# Patient Record
Sex: FEMALE | Race: WHITE | NOT HISPANIC OR LATINO | Employment: OTHER | URBAN - METROPOLITAN AREA
[De-identification: names, ages, dates, MRNs, and addresses within clinical notes are randomized per-mention and may not be internally consistent; named-entity substitution may affect disease eponyms.]

---

## 2018-09-25 ENCOUNTER — OFFICE VISIT CONVERTED (OUTPATIENT)
Dept: SURGERY | Facility: CLINIC | Age: 58
End: 2018-09-25
Attending: SURGERY

## 2018-10-18 ENCOUNTER — CONVERSION ENCOUNTER (OUTPATIENT)
Dept: SURGERY | Facility: CLINIC | Age: 58
End: 2018-10-18

## 2018-10-18 ENCOUNTER — OFFICE VISIT CONVERTED (OUTPATIENT)
Dept: SURGERY | Facility: CLINIC | Age: 58
End: 2018-10-18
Attending: SURGERY

## 2019-08-27 ENCOUNTER — HOSPITAL ENCOUNTER (OUTPATIENT)
Dept: CARDIOLOGY | Facility: HOSPITAL | Age: 59
Discharge: HOME | End: 2019-08-27
Attending: NURSE PRACTITIONER

## 2020-06-22 ENCOUNTER — HOSPITAL ENCOUNTER (OUTPATIENT)
Dept: OTHER | Facility: HOSPITAL | Age: 60
Discharge: HOME | End: 2020-06-22
Attending: NURSE PRACTITIONER

## 2020-06-22 LAB
CREAT BLD-MCNC: 0.7 MG/DL (ref 0.6–1.4)
GFR SERPLBLD BASED ON 1.73 SQ M-ARVRAT: >60 ML/MIN/{1.73_M2}

## 2020-06-26 ENCOUNTER — OFFICE VISIT CONVERTED (OUTPATIENT)
Dept: CARDIOLOGY | Facility: CLINIC | Age: 60
End: 2020-06-26
Attending: INTERNAL MEDICINE

## 2020-06-26 ENCOUNTER — CONVERSION ENCOUNTER (OUTPATIENT)
Dept: CARDIOLOGY | Facility: CLINIC | Age: 60
End: 2020-06-26

## 2020-07-07 ENCOUNTER — CONVERSION ENCOUNTER (OUTPATIENT)
Dept: CARDIOLOGY | Facility: CLINIC | Age: 60
End: 2020-07-07
Attending: INTERNAL MEDICINE

## 2020-07-17 ENCOUNTER — OFFICE VISIT CONVERTED (OUTPATIENT)
Dept: CARDIOLOGY | Facility: CLINIC | Age: 60
End: 2020-07-17
Attending: INTERNAL MEDICINE

## 2020-07-21 ENCOUNTER — HOSPITAL ENCOUNTER (OUTPATIENT)
Dept: CARDIOLOGY | Facility: HOSPITAL | Age: 60
Discharge: HOME | End: 2020-07-21
Attending: INTERNAL MEDICINE

## 2020-07-27 ENCOUNTER — HOSPITAL ENCOUNTER (OUTPATIENT)
Dept: NUCLEAR MEDICINE | Facility: HOSPITAL | Age: 60
Discharge: HOME | End: 2020-07-27
Attending: INTERNAL MEDICINE

## 2020-09-25 ENCOUNTER — OFFICE VISIT CONVERTED (OUTPATIENT)
Dept: ORTHOPEDIC SURGERY | Facility: CLINIC | Age: 60
End: 2020-09-25
Attending: ORTHOPAEDIC SURGERY

## 2020-09-29 ENCOUNTER — OFFICE VISIT CONVERTED (OUTPATIENT)
Dept: CARDIOLOGY | Facility: CLINIC | Age: 60
End: 2020-09-29
Attending: INTERNAL MEDICINE

## 2020-10-30 ENCOUNTER — OFFICE VISIT CONVERTED (OUTPATIENT)
Dept: ORTHOPEDIC SURGERY | Facility: CLINIC | Age: 60
End: 2020-10-30
Attending: ORTHOPAEDIC SURGERY

## 2020-12-15 ENCOUNTER — CONVERSION ENCOUNTER (OUTPATIENT)
Dept: ORTHOPEDIC SURGERY | Facility: CLINIC | Age: 60
End: 2020-12-15

## 2020-12-15 ENCOUNTER — OFFICE VISIT CONVERTED (OUTPATIENT)
Dept: ORTHOPEDIC SURGERY | Facility: CLINIC | Age: 60
End: 2020-12-15
Attending: ORTHOPAEDIC SURGERY

## 2021-01-26 ENCOUNTER — CONVERSION ENCOUNTER (OUTPATIENT)
Dept: ORTHOPEDIC SURGERY | Facility: CLINIC | Age: 61
End: 2021-01-26

## 2021-01-26 ENCOUNTER — OFFICE VISIT CONVERTED (OUTPATIENT)
Dept: ORTHOPEDIC SURGERY | Facility: CLINIC | Age: 61
End: 2021-01-26
Attending: PHYSICIAN ASSISTANT

## 2021-05-10 NOTE — H&P
History and Physical      Patient Name: Tiffani Stacy   Patient ID: [id number]   Sex: Female   YOB: 1960    Primary Care Provider: Ema HASSAN   Referring Provider: Ema HASSAN    Visit Date: June 26, 2020    Provider: Josué Tellez MD   Location: [facility]   Location Address: [address] [place] [address], [facility]   RAGHAVENDRA Mora  [id number]   Location Phone: [phone]          Chief Complaint     Shortness of breath.       History Of Present Illness  Consult requested by: Ema HASSAN   Tiffani Stacy is a 59 year old /White female who presents as a new patient referred by Dr. Jung due to worsening exertional dyspnea and recurrent episodes of substernal chest pain. She has a history of aortic stenosis and has previously followed with Dr. Lora with [facility]. Her last echocardiogram was approximately 1 year ago. I do not have the results of that study at present, but per Dr. Lora's office notes, her previous cardiac testing showed a normal ejection fraction. She is overweight with a BMI of 39.3 and also has a history of hypertension, but denies any history of diabetes mellitus, tobacco abuse, or hyperlipidemia. Her episodes of chest pain typically occur at rest, although she does report a few episodes of exertional chest discomfort. Her episodes of pain consistently resolve spontaneously within 15-20 minutes. She cannot identify any other precipitating factors for her chest pain. She has not taken any nitroglycerin or other medications for the pain. Her pain is nonradiating and is a 5/10 at peak intensity. She reports some associated generalized fatigue and shortness of breath, but no palpitations, nausea, diaphoresis, or other symptoms. Likewise, she has not experienced any orthopnea, PND, syncope, or lightheadedness. She experiences limiting dyspnea after walking approximately 1-2 blocks at this point. She states  this has slightly worsened over the past 6 months.   PAST MEDICAL & SURGICAL HISTORY: Aortic stenosis; Essential hypertension; Gastroesophageal reflux disease; Glaucoma; Obesity; Cholecystectomy; Hysterectomy; Bilateral cataract excision.   PSYCHOSOCIAL HISTORY: She denies any history of tobacco abuse. However, she does report significant second-hand smoke exposure for many years from multiple family members. She drinks 2-3 alcoholic beverages per week, but has no history of alcohol abuse or illicit drug use. Daily caffeine intake. She is single.   FAMILY HISTORY: Positive for diabetes mellitus, hypertension, and heart disease.   CURRENT MEDICATIONS: Alphagan eye drops b.i.d.; bupropion 300 mg daily; cetirizine 10 mg daily; citalopram 10 mg daily; esomeprazole 20 mg daily; Flonase 50 mcg p.r.n.; hydrochlorothiazide 12.5 mg daily; losartan 50 mg daily; multivitamin daily; Mobic 7.5 mg daily; vitamin E 1,000 mg weekly. Dosage and frequency of the medication(s) reviewed with the patient.   ALLERGIES: Naproxen; Penicillin.       Review of Systems  · Constitutional  o Admits  o : fatigue, good general health lately, recent weight changes   · Eyes  o Denies  o : double vision  · HENT  o Denies  o : hearing loss or ringing, chronic sinus problem, swollen glands in neck  · Cardiovascular  o Admits  o : chest pain, palpitations (fast, fluttering, or skipping beats), swelling (feet, ankles, hands), shortness of breath while walking or lying flat  · Respiratory  o Admits  o : chronic or frequent cough  o Denies  o : asthma or wheezing, COPD  · Gastrointestinal  o Admits  o : ulcers, nausea or vomiting  · Neurologic  o Admits  o : lightheaded or dizzy, headaches  o Denies  o : stroke  · Musculoskeletal  o Admits  o : joint pain, back pain  · Endocrine  o Denies  o : thyroid disease, diabetes, heat or cold intolerance, excessive thirst or urination  · Heme-Lymph  o Admits  o : bleeding or bruising tendency  o Denies  o :  "anemia      Vitals  Date Time BP Position Site L\R Cuff Size HR RR TEMP (F) WT  HT  BMI kg/m2 BSA m2 O2 Sat HC       06/26/2020 03:04 /74 Sitting    86 - R   214lbs 16oz 5'  2\" 39.32 2.07     06/26/2020 03:04 /76 Sitting                     Physical Examination  · Constitutional  o Appearance  o : Awake, alert, in no acute distress.  · Head and Face  o HEENT  o : No pallor, anicteric. Eyes normal. Moist mucous membranes.  · Neck  o Inspection/Palpation  o : Supple. No hepatosplenomegaly.  o Jugular Veins  o : No JVD. No carotid bruits.  · Respiratory  o Auscultation of Lungs  o : Clear to auscultation bilaterally. No crackles or wheezing.  · Cardiovascular  o Heart  o : Regular rate and rhythm. Normal S1 and S2. 2/6 systolic ejection murmur at the right upper sternal border with radiation to the carotids. No friction rub or gallop. PMI is nondisplaced.   · Gastrointestinal  o Abdominal Examination  o : Obese.   · Musculoskeletal  o General  o : Normal muscle tone and strength.  · Skin and Subcutaneous Tissue  o General Inspection  o : No skin rashes.  · Extremities  o Extremities  o : Trace bilateral lower extremity edema to mid-calf.   · Labs  o Labs  o : Labs from 06/09/2020 reviewed. Chemistry: Sodium 140, potassium 4.4, chloride 103, bicarb 23, BUN 15, creatinine 0.71, glucose 109. Liver function tests were within normal limits. BNP was normal at 86.8.  · Echocardiogram  o Echocardiogram  o : Previous echocardiogram from [facility] has been requested but not yet received.           Assessment     1.  Aortic stenosis.  2.  Essential hypertension, suboptimally controlled.  3.  Obesity with a BMI of 39.3 today.  4.  Recurrent chest pain, primarily atypical in nature.    5.  Chronic exertional dyspnea, mildly progressive over the last 6 months.       Plan     1.  We will obtain an echocardiogram within the next week to reassess her aortic stenosis.  I have requested        her old echocardiogram " from [facility], but we have not yet received it.  We will review it when        we receive those records.  She wishes to defer changes to her antihypertensive medication regimen for        now.    2.  She was previously scheduled to have a PET stress test with Dr. Lora, but states today that she        wishes to switch her cardiac care to our office.   3.   I will see her back in the office in a few weeks to review her echocardiogram results.  We will likely order         a cardiac ischemic evaluation at that time, particularly if the echocardiogram is unrevealing.      Josué Tellez MD  BP:vm             Electronically Signed by: Soumya Perrin-, Other -Author on June 29, 2020 04:11:35 PM  Electronically Co-signed by: Josué Tellez MD -Reviewer on July 6, 2020 08:13:22 PM

## 2021-05-10 NOTE — PROCEDURES
"   Procedure Note      Patient Name: Tiffani Stacy   Patient ID: [id number]   Sex: Female   YOB: 1960    Primary Care Provider: Ema HASSAN   Referring Provider: Ema HASSAN    Visit Date: July 7, 2020    Provider: Josué Tellez MD   Location: [facility]   Location Address: [address] [place] [address], [address]   RAGHAVENDRA Mora  [id number]   Location Phone: [phone]          FINAL REPORT   TRANSTHORACIC ECHOCARDIOGRAM REPORT    Diagnosis: Aortic stenosis , exertional dyspnea.   Height: 5'2\" Weight: 215 B/P: 150/74 BSA: 2.0   Tech: JTW   MEASUREMENTS:  RVID (Diastole) : RVID. (NORMAL: 0.7 to 2.4 cm max)   LVID (Systole): 2.7 cm (Diastole): 4.3 cm . (NORMAL: 3.7 - 5.4 cm)   Posterior Wall Thickness (Diastole): 1.0 cm. (NORMAL: 0.8 - 1.1 cm)   Septal Thickness (Diastole): 1.0 cm. (NORMAL: 0.7 - 1.2 cm)   LAID (Systole): 3.8 cm. (NORMAL: 1.9 - 3.8 cm)   Aortic Root Diameter (Diastole): 3.1 cm. (NORMAL: 2.0 - 3.7 cm)   COMMENTS:  The patient underwent 2-D, M-Mode, and Doppler examination, including pulse-wave, continuous-wave, and color-flow Doppler analysis; the study is technically adequate. The following findings were noted:   FINDINGS:  MITRAL VALVE: The mitral valve leaflets are mildly thickened, but open well. Mild mitral annular calcification. No mitral valve prolapse or mitral stenosis. Mild mitral regurgitation.   AORTIC VALVE: Trileaflet aortic valve with mildly fibrocalcific leaflets. Moderate aortic regurgitation. Mild aortic stenosis. As noted, there is an increased gradient across the left ventricular outflow tract consistent with mild to moderate subvalvular stenosis.   TRICUSPID VALVE: Normal valve morphology and leaflet excursion. Mild tricuspid regurgitation. Estimated right ventricular systolic pressure was 35 mmHg.   PULMONIC VALVE: Grossly normal. No pulmonic stenosis. Trivial pulmonic regurgitation.   AORTIC ROOT: Normal in size with adequate motion. "   LEFT ATRIUM: Moderate-severely dilated left atrium. LA volume index is 45 mL/m2. Color Doppler imaging across the interatrial septum did not demonstrate any evidence of PFO/shunting.   LEFT VENTRICLE: Normal left ventricular size. Borderline concentric left ventricular hypertrophy. Moderate basal septal hypertrophy with a sigmoid septum. An increased gradient was noted in the left ventricular outflow tract with a peak gradient of 25 mmHg and a mean gradient of 13 mmHg. Normal left ventricular systolic function with estimated ejection fraction of 60 to 65% and normal left ventricular wall motion throughout. Impaired relaxation filling pattern (grade 1 diastolic dysfunction). Indeterminate left ventricular filling pressure. No ventricular thrombus or mass visualized.   RIGHT VENTRICLE: Normal right ventricular size and systolic function.   RIGHT ATRIUM: Normal right atrial size.   PERICARDIUM: No pericardial effusion.   INFERIOR VENA CAVA: Normal IVC size and respirophasic changes.   DOPPLER: E/A ratio is 1.1. DT= 194 msec. IVRT is 113 msec. E/E' is 10.   Faxed: 07/10/2020      CONCLUSIONS:  1.  Normal left ventricular systolic function with an estimated ejection fraction of 60 to 65% and normal left        ventricular wall motion throughout.   2.  Moderate basal septal hypertrophy with a sigmoid septum.  An increased gradient was observed across the        left ventricular outflow tract consistent with mild-moderate subvalvular stenosis.   3.  Moderate to severely dilated left atrium.   4.  Mildly thickened mitral valve leaflets with mild mitral regurgitation.   5.  Moderate aortic regurgitation.       MD STACIA Ochoa/pap      This note was transcribed by Marilu Lin.  vinod/stacia  The above service was transcribed by Marilu Lin, and I attest to the accuracy of the note.  STACIA                 Electronically Signed by: Leatha Lin-, Other -Author on July 10, 2020 03:50:51  PM  Electronically Co-signed by: Josué Tellez MD -Reviewer on July 14, 2020 12:20:13 AM

## 2021-05-10 NOTE — H&P
History and Physical      Patient Name: Tiffani Stacy   Patient ID: [id number]   Sex: Female   YOB: 1960    Primary Care Provider: Ema HASSAN   Referring Provider: Ema HASSAN    Visit Date: September 25, 2020    Provider: Natalie Marques MD   Location: [facility]   Location Address: [address] [place] [address]  [id number]   Location Phone: [phone]          Chief Complaint  · right knee pain      History Of Present Illness  Tiffani Stacy is a 60 year old /White female who presents today to [facility].      Patient presents today with a chief complaint of right knee pain. Patient states that 6 months ago a nurse did a X-ray and was told her cartilage was almost gone. Patient states this recent Saturday she had to leave work because her knee was swollen and she couldn't bend it. Patient denies any injury or trauma to her knee. Patient states 6 months ago her knee has been swelling and started causing her pain. Patient states her PCP doesn't want her getting the cortisone injections done because of her glaucoma. Patient wears a brace as her treatment method. Patient is taking Mobic for her OA. Patient can't take Naproxen or Penicillin.                 Past Medical History  Aortic valve stenosis; Arthritis; Chest pain; Disc degeneration, lumbar; Heart Disease; HTN (hypertension); Hypertension; Limb Swelling; Reflux; Seasonal allergies; Shortness of Breath; Ulcer         Past Surgical History  Cholecystectomy; Eye Implant; Gallbladder; Hysterectomy; Neck         Medication List  Alive Multi Vit; bupropion HCl 300 mg oral tablet extended release 24 hr; Celexa 20 mg oral tablet; cetirizine 10 mg oral tablet; diclofenac sodium 75 mg oral tablet,delayed release (DR/EC); Miriam-C 500 mg oral; Flonase Allergy Relief 50 mcg/actuation nasal spray,suspension; losartan-hydrochlorothiazide 100-25 mg oral tablet; Lumigan 0.01 % ophthalmic (eye) drops; Nexium  "20 mg oral capsule,delayed release(DR/EC); vitamin E 400 unit oral capsule; Zaditor 0.025 % (0.035 %) ophthalmic (eye) drops         Allergy List  Molds; PENICILLINS         Family Medical History  Stroke; Heart Disease; Congestive Heart Failure; Cancer, Unspecified; Diabetes, unspecified type; Skin Carcinoma; Family history of COPD (chronic obstructive pulmonary disease); Family history of Arthritis         Social History  Alcohol Use (Never); Claustophobic (Unknown); lives with other; Recreational Drug Use (Never); Single.; Tobacco (Never); Tobacco use; Working         Review of Systems  · Constitutional  o Denies  o : fever, chills, weight loss  · Cardiovascular  o Denies  o : chest pain, shortness of breath  · Gastrointestinal  o Denies  o : liver disease, heartburn, nausea, blood in stools  · Genitourinary  o Denies  o : painful urination, blood in urine  · Integument  o Denies  o : rash, itching  · Neurologic  o Denies  o : headache, weakness, loss of consciousness  · Musculoskeletal  o Denies  o : painful, swollen joints  · Psychiatric  o Denies  o : drug/alcohol addiction, anxiety, depression      Vitals  Date Time BP Position Site L\R Cuff Size HR RR TEMP (F) WT  HT  BMI kg/m2 BSA m2 O2 Sat FR L/min FiO2 HC       09/25/2020 10:11 AM      69 - R   220lbs 2oz 5'  2\" 40.26 2.09 97 %            Physical Examination  · Constitutional  o Appearance  o : well developed, well-nourished, no obvious deformities present  · Head and Face  o Head  o :   § Inspection  § : normocephalic  o Face  o :   § Inspection  § : no facial lesions  · Eyes  o Conjunctivae  o : conjunctivae normal  o Sclerae  o : sclerae white  · Ears, Nose, Mouth and Throat  o Ears  o :   § External Ears  § : appearance within normal limits  § Hearing  § : intact  o Nose  o :   § External Nose  § : appearance normal  · Neck  o Inspection/Palpation  o : normal appearance  o Range of Motion  o : full range of motion  · Respiratory  o Respiratory " Effort  o : breathing unlabored  o Inspection of Chest  o : normal appearance  o Auscultation of Lungs  o : no audible wheezing or rales  · Cardiovascular  o Heart  o : regular rate  · Gastrointestinal  o Abdominal Examination  o : soft and non-tender  · Skin and Subcutaneous Tissue  o General Inspection  o : intact, no rashes  · Psychiatric  o General  o : Alert and oriented x3  o Judgement and Insight  o : judgment and insight intact  o Mood and Affect  o : mood normal, affect appropriate  · Right Knee  o Inspection  o : Sensation grossly intact. Neurovascular intact. Pulses normal. Flexion and extension 10 to 90 degrees. Mild swelling of the right knee. Mild tenderness on lateral and medial joint line. Non-tender patella. Stable to valgus/varus stress. Good strength in quadriceps, hamstrings, dorsiflexors, and plantar flexors.  · In Office Procedures  o View  o : LAT/SUNRISE/STANDING   o Site  o : right, knee  o Indication  o : Right knee pain   o Study  o : X-rays ordered, taken in the office, and reviewed today.  o Xray  o : Negative for fracture or dislocation, moderate osteoarthritis .   o Comparative Data  o : No comparative data found          Assessment  · Right knee pain, unspecified chronicity     719.46/M25.561      Plan  · Orders  o Knee (Right) [facility] Preferred View ([address]) - 719.46/M25.561 - 09/25/2020  · Medications  o Medications have been Reconciled  o Transition of Care or Provider Policy  · Instructions  o Dr. Marques saw and examined the patient and agrees with plan.   o X-rays reviewed by Dr. Marques.  o Reviewed the patient's Past Medical, Social, and Family history as well as the ROS at today's visit, no changes.  o Call or return if worsening symptoms.  o Follow Up PRN.  o This note was transcribed by Deann Heath.   o Discussed diagnosis and treatment options with the patient. Discussed change in medication, Synvisc injection, PT and at home exercises. Patient opted for getting the Synvisc  injection approved. Patient will opt for PT and medication change. Patient to be off work until injection and patient opted for PT. Patient follow-up PRN.             Electronically Signed by: Melinda Truong, -Author on October 15, 2020 03:17:37 PM  Electronically Co-signed by: Natalie Marques MD -Reviewer on October 16, 2020 08:09:22 AM

## 2021-05-13 NOTE — PROGRESS NOTES
Progress Note      Patient Name: Tiffani Stacy   Patient ID: [id number]   Sex: Female   YOB: 1960    Primary Care Provider: Ema HASSAN   Referring Provider: Ema HASSAN    Visit Date: July 17, 2020    Provider: Josué Tellez MD   Location: [facility]   Location Address: [address] [place] [address], [facility]   [place]  [id number]   Location Phone: [phone]          Chief Complaint  · Shortness of breath       History Of Present Illness  REFERRING CARE PROVIDER: Pratik Jung MD   Tiffani Stacy presents for a routine follow-up and the results of her recent echocardiogram today. It showed normal left ventricular systolic function with an estimated ejection fraction of 60-65% and normal left ventricular wall motion throughout. She was also observed to have moderate basal septal hypertrophy with a sigmoid septum and mild left ventricular outflow tract obstruction with peak and mean gradients of 25 and 13 mmHg respectively. In addition, she had mild valvular aortic stenosis with moderate aortic regurgitation. She continues to have stable exertional dyspnea, not significantly changed since her last visit. No palpitations, PND, orthopnea, syncope or lightheadedness reported. She continues to have intermittent episodes of substernal chest pain. She states the pain is non-radiating, 5 out of 10 at peak intensity and associated with generalized fatigue. It has not increased in frequency, duration or intensity since her last visit in June. Most of her episodes of pain continue to recur at rest, but she has experienced a few episodes of exertional chest discomfort. She also states she had significant right, lower-extremity calf swelling earlier this past week as well as some erythema and mild tenderness in this area. This was largely resolved at this point, although she continues to have some mildly increased, right lower-extremity edema.   PAST MEDICAL HISTORY:  Hypertension; obesity; gastroesophageal reflux disease; mild left ventricular outflow tract obstruction; moderate aortic regurgitation.   PSYCHOSOCIAL HISTORY: No history of tobacco use. No history of alcohol abuse or illicit drug use.   CURRENT MEDICATIONS: include Losartan 50 mg daily; HCTZ 12.5 mg daily; Bupor 300 mg daily; Celexa 10 mg daily; Mobic 7.5 mg daily; Nexium OTC 12.5 mg daily; Zyrtec 10 mg daily; Vitamin E; multivitamin; Vitamin C 1000 mg once a week; Brimonidine . The dosage and frequency of the medications were reviewed with the patient.       Review of Systems  · Cardiovascular  o Admits  o : palpitations (fast, fluttering, or skipping beats), swelling (feet, ankles, hands), shortness of breath while walking or lying flat, chest pain or angina pectoris   · Respiratory  o Admits  o : chronic or frequent cough  o Denies  o : asthma or wheezing      Vitals  Date Time BP Position Site L\R Cuff Size HR RR TEMP (F) WT  HT  BMI kg/m2 BSA m2 O2 Sat        07/17/2020 11:17 /72 Sitting                     Physical Examination  · Respiratory  o Auscultation of Lungs  o : Clear to auscultation bilaterally. No crackles or rhonchi.  · Cardiovascular  o Heart  o : Regular rate and rhythm. S1 and S2 are present. No S3 or S4 gallop. There is a 2/6 systolic murmur at the right upper-sternal border with a 2/6 holodiastolic murmur also noted. No friction rub.  · Gastrointestinal  o Abdominal Examination  o : Obese, soft, nontender, nondistended. No free fluid. Bowel sounds heard in all four quadrants.  · Extremities  o Extremities  o : 1+ bilateral lower-extremity edema, right greater than left. No erythema of the right lower extremity.           Assessment     1.  Chronic exertional dyspnea, Class III, stable.  2.  Recurrent atypical chest pain.  3.  Some valvular aortic stenosis with mild left ventricular outflow tract obstruction secondary to basal septal       hypertrophy.  4.  Mild valvular aortic  stenosis with moderate aortic regurgitation.  5.  Hypertension.  6.  Obesity with a recent BMI of 39.3.  7.  Acute right lower-extremity edema/swelling.  The patient does have a strong family history of DVT.       Plan     I will obtain a venous Doppler ultrasound of her right lower extremity to rule out DVT and further assess her acute right lower-extremity edema/swelling.  Given her recurrent ongoing chest pain and multiple cardiovascular risk factors, will obtain a lexiscan stress test/SPECT study for cardiovascular risk stratification, as Ms. Stacy does not believe she can exercise due to chronic joint pain.  Will start beta blocker therapy with Metoprolol 25 mg b.i.d. due to her mild left ventricular outflow tract obstruction.  She states that she previously did not tolerate a non-selective beta blocker prescribed for glaucoma but is willing to give Metoprolol a trial.  I told her to call the office for further direction if she develops significant side effects on this medication.  May need to consider Disopyramide if she does not tolerate beta blockers well.    Josué Tellez MD  BP/dmd           This note was transcribed by Rosy Hudson.  dmd/BP  The above service was transcribed by Rosy Hudson, and I attest to the accuracy of the note.  BP               Electronically Signed by: Rosy Hudson-, -Author on July 20, 2020 10:33:21 AM  Electronically Co-signed by: Josué Tellez MD -Reviewer on July 20, 2020 04:06:09 PM

## 2021-05-13 NOTE — PROGRESS NOTES
Progress Note      Patient Name: Tiffani Stacy   Patient ID: [id number]   Sex: Female   YOB: 1960    Primary Care Provider: Ema HASSAN   Referring Provider: Ema HASSAN    Visit Date: October 30, 2020    Provider: Natalie Marques MD   Location: [facility]   Location Address: [address] [place] [address]  [id number]   Location Phone: [phone]          Chief Complaint  · Followup Right Knee Osteoarthritis.      History Of Present Illness  Tiffani Stacy is a 60 year old /White female who presents for followup of her right knee. She has been having pain. We tried to get viscosupplementation approved for her, but this has been unsuccessful. She says she cannot go to therapy right now because she cannot afford it because she is off work.       Past Medical History  Aortic valve stenosis; Arthritis; Chest pain; Disc degeneration, lumbar; Heart Disease; HTN (hypertension); Hypertension; Limb Swelling; Reflux; Seasonal allergies; Shortness of Breath; Ulcer         Past Surgical History  Cholecystectomy; Eye Implant; Gallbladder; Hysterectomy; Neck         Medication List  Alive Multi Vit; bupropion HCl 300 mg oral tablet extended release 24 hr; Celexa 20 mg oral tablet; cetirizine 10 mg oral tablet; diclofenac sodium 75 mg oral tablet,delayed release (DR/EC); Miriam-C 500 mg oral; Flonase Allergy Relief 50 mcg/actuation nasal spray,suspension; losartan-hydrochlorothiazide 100-25 mg oral tablet; Lumigan 0.01 % ophthalmic (eye) drops; Nexium 20 mg oral capsule,delayed release(DR/EC); vitamin E 400 unit oral capsule; Zaditor 0.025 % (0.035 %) ophthalmic (eye) drops         Allergy List  Molds; PENICILLINS         Family Medical History  Stroke; Heart Disease; Congestive Heart Failure; Cancer, Unspecified; Diabetes, unspecified type; Skin Carcinoma; Family history of COPD (chronic obstructive pulmonary disease); Family history of Arthritis         Social History  Alcohol  "Use (Never); Claustophobic (Unknown); lives with other; Recreational Drug Use (Never); Single.; Tobacco (Never); Tobacco use; Working         Review of Systems  · Constitutional  o Denies  o : fever, chills, weight loss  · Cardiovascular  o Denies  o : chest pain, shortness of breath  · Gastrointestinal  o Denies  o : liver disease, heartburn, nausea, blood in stools  · Genitourinary  o Denies  o : painful urination, blood in urine  · Integument  o Denies  o : rash, itching  · Neurologic  o Denies  o : headache, weakness, loss of consciousness  · Musculoskeletal  o Admits  o : painful, swollen joints  · Psychiatric  o Denies  o : drug/alcohol addiction, anxiety, depression      Vitals  Date Time BP Position Site L\R Cuff Size HR RR TEMP (F) WT  HT  BMI kg/m2 BSA m2 O2 Sat FR L/min FiO2        10/30/2020 10:12 AM      86 - R   218lbs 0oz 5'  2\" 39.87 2.08 95 %            Physical Examination  · Constitutional  o Appearance  o : well developed, well-nourished, no obvious deformities present  · Head and Face  o Head  o :   § Inspection  § : normocephalic  o Face  o :   § Inspection  § : no facial lesions  · Eyes  o Conjunctivae  o : conjunctivae normal  o Sclerae  o : sclerae white  · Ears, Nose, Mouth and Throat  o Ears  o :   § External Ears  § : appearance within normal limits  § Hearing  § : intact  o Nose  o :   § External Nose  § : appearance normal  · Neck  o Inspection/Palpation  o : normal appearance  o Range of Motion  o : full range of motion  · Respiratory  o Respiratory Effort  o : breathing unlabored  o Inspection of Chest  o : normal appearance  o Auscultation of Lungs  o : no audible wheezing or rales  · Cardiovascular  o Heart  o : regular rate  · Gastrointestinal  o Abdominal Examination  o : soft and non-tender  · Skin and Subcutaneous Tissue  o General Inspection  o : intact, no rashes  · Psychiatric  o General  o : Alert and oriented x3  o Judgement and Insight  o : judgment and insight " intact  o Mood and Affect  o : mood normal, affect appropriate  · Right Knee  o Inspection  o : Joint line tenderness. Tender to palpation. Pain with movement. Positive crepitus. Sensation intact. Pulses normal.           Assessment  · Primary osteoarthritis of right knee     715.16/M17.11  · Right knee pain, unspecified chronicity     719.46/M25.561      Plan  · Medications  o Medications have been Reconciled  o Transition of Care or Provider Policy  · Instructions  o Reviewed the patient's Past Medical, Social, and Family history as well as the ROS at today's visit, no changes.  o Call or return if worsening symptoms.  o Note transcribed by Soumya Perrin. mc  o We are going to have her work on exercises at home and take her anti-inflammatories to get her back to work. We will see if we can get this shot approved in the future. Note for work was given. Follow up p.r.n.             Electronically Signed by: Soumya Perrin-, Other -Author on October 31, 2020 12:40:59 PM  Electronically Co-signed by: Natalie Marques MD -Reviewer on November 1, 2020 02:46:46 PM

## 2021-05-13 NOTE — PROGRESS NOTES
Progress Note      Patient Name: Tiffani Stacy   Patient ID: [id number]   Sex: Female   YOB: 1960    Primary Care Provider: Ema HASSAN   Referring Provider: Ema HASSAN    Visit Date: September 29, 2020    Provider: Josué Tellez MD   Location: [facility]   Location Address: [address] [place] [address], [facility]   RAGHAVENDRA Mora  [id number]   Location Phone: [phone]          Chief Complaint  · Mild shortness of breath       History Of Present Illness  REFERRING CARE PROVIDER: Ema HASSAN   Tiffani Stacy presents for a routine follow-up and the results of her recent testing, which showed no evidence of ischemia or infarction with a calculated ejection fraction of 72% and normal left ventricular wall motion and thickening throughout. Her right lower-extremity venous duplex study showed no evidence of DVT. She does not report any further lower-extremity swelling and states she is feeling well. No episodes of chest pain/pressure, palpitations, orthopnea, PND, lightheadedness or syncope reported. She continues to have some mild exertional dyspnea but states this has actually improved since starting Metoprolol at her last visit. She is tolerating Metoprolol and her other medications well and reports good compliance with them. Her blood pressure is well controlled at 120/58 today.   PAST MEDICAL & PAST SURGICAL HISTORY: Aortic stenosis; essential hypertension; gastroesophageal reflux disease; glaucoma; obesity; cholecystectomy; hysterectomy; bilateral cataract excision.   PSYCHOSOCIAL HISTORY: She rarely drinks alcohol.   CURRENT MEDICATIONS: include Losartan 50 mg daily; HCTZ 12.5 mg daily; Bupropion 300 mg daily; Celexa 10 mg daily; Nexium OTC 12.5 mg daily; Zyrtec 10 mg daily; Vitamin E; multivitamin; Vitamin C 1000 mg once a week; Brimonidine; Metoprolol Tartrate 25 mg b.i.d.; Diclofenac 75 mg b.i.d. The dosage and frequency of the medications were reviewed with the  "patient.       Review of Systems  · Cardiovascular  o Admits  o : palpitations (fast, fluttering, or skipping beats), swelling (feet, ankles, hands), shortness of breath while walking or lying flat, chest pain or angina pectoris   · Respiratory  o Denies  o : chronic or frequent cough      Vitals  Date Time BP Position Site L\R Cuff Size HR RR TEMP (F) WT  HT  BMI kg/m2 BSA m2 O2 Sat FR L/min FiO2 HC       09/29/2020 10:38 /58 Sitting    62 - R   218lbs 0oz 5'  2\" 39.87 2.08             Physical Examination  · Respiratory  o Auscultation of Lungs  o : Clear to auscultation bilaterally. No wheezing, rhonchi or rales. Normal effort. No tachypnea.  · Cardiovascular  o Heart  o : Regular rate and rhythm. Normal S1 and S2. No S3 or S4 gallop. There is a 2/6 systolic murmur at the upper sternal border. No friction, rub or heave.  · Gastrointestinal  o Abdominal Examination  o : Soft, obese, nontender, nondistended. Normal bowel sounds in all quadrants. No masses. No hepatosplenomegaly.   · Extremities  o Extremities  o : No cyanosis or clubbing. No significant lower-extremity edema. 2+ radial pulses bilaterally.          Assessment     1.  Mild left ventricular outflow tract obstruction secondary to basal septal hypertrophy noted on recent       echocardiogram.  2.  Mild valvular aortic stenosis with moderate aortic regurgitation.  3.  Hypertension, well controlled on current medical therapy.  4.  Obesity with a BMI of 39.  5.  Chronic exertional dyspnea, Class II, improved since starting medical therapy with Metoprolol.       Plan     Her symptoms have improved significantly since starting low-dose beta blocker therapy with Metoprolol 25 mg twice daily.  Her LVOT obstruction noted on the recent echocardiogram was mild.  I will plan to repeat an echocardiogram in 2 to 3 years or sooner if she develops worsening symptoms.  Her recent SPECT study was unremarkable and reassuring, so I do not believe she needs further " cardiac testing at this time.  Will continue her current medications.  I will just plan to see her back in the office in one year for a follow-up if she continues to feel well.    Josué Tellez MD  BP/dmd           This note was transcribed by Rosy Hudson.  dmd/BP  The above service was transcribed by Rosy Hudson, and I attest to the accuracy of the note.  BP               Electronically Signed by: Rosy Hudson-, -Author on October 5, 2020 05:06:11 AM  Electronically Co-signed by: Josué Tellez MD -Reviewer on October 23, 2020 10:09:24 AM

## 2021-05-14 VITALS — HEART RATE: 86 BPM | HEIGHT: 62 IN | OXYGEN SATURATION: 95 % | WEIGHT: 218 LBS | BODY MASS INDEX: 40.12 KG/M2

## 2021-05-14 VITALS
DIASTOLIC BLOOD PRESSURE: 58 MMHG | HEIGHT: 62 IN | BODY MASS INDEX: 40.12 KG/M2 | SYSTOLIC BLOOD PRESSURE: 120 MMHG | HEART RATE: 62 BPM | WEIGHT: 218 LBS

## 2021-05-14 VITALS — BODY MASS INDEX: 40.5 KG/M2 | OXYGEN SATURATION: 97 % | HEIGHT: 62 IN | HEART RATE: 69 BPM | WEIGHT: 220.12 LBS

## 2021-05-14 VITALS — WEIGHT: 215 LBS | HEIGHT: 62 IN | HEART RATE: 67 BPM | BODY MASS INDEX: 39.56 KG/M2 | OXYGEN SATURATION: 95 %

## 2021-05-14 VITALS — WEIGHT: 218 LBS | HEIGHT: 62 IN | BODY MASS INDEX: 40.12 KG/M2

## 2021-05-14 NOTE — PROGRESS NOTES
Progress Note      Patient Name: Tiffani Stacy   Patient ID: [id number]   Sex: Female   YOB: 1960    Primary Care Provider: Ema HASSAN   Referring Provider: Ema HASSAN    Visit Date: January 26, 2021    Provider: Carmen Gooden PA-C   Location: [facility]   Location Address: [address] [place] [address]  [id number]   Location Phone: [phone]          Chief Complaint  · Follow up right knee pain      History Of Present Illness  Tiffani Stacy is a 60 year old /White female who presents today to [facility].      Patient is following up for right knee pain, right knee osteoarthritis. Patient states Synvisc injection is providing some relief of pain in right knee. Patient states mild pain on the medial and lateral sides of the knee. Patient states difficulty with prolonged standing.       Past Medical History  Aortic valve stenosis; Arthritis; Chest pain; Disc degeneration, lumbar; Heart Disease; HTN (hypertension); Hypertension; Limb Swelling; Reflux; Seasonal allergies; Shortness of Breath; Ulcer         Past Surgical History  Cholecystectomy; Eye Implant; Gallbladder; Hysterectomy; Neck         Medication List  Alive Multi Vit; bupropion HCl 300 mg oral tablet extended release 24 hr; Celexa 20 mg oral tablet; cetirizine 10 mg oral tablet; diclofenac sodium 75 mg oral tablet,delayed release (DR/EC); Miriam-C 500 mg oral; Flonase Allergy Relief 50 mcg/actuation nasal spray,suspension; losartan-hydrochlorothiazide 100-25 mg oral tablet; Lumigan 0.01 % ophthalmic (eye) drops; Nexium 20 mg oral capsule,delayed release(DR/EC); vitamin E 400 unit oral capsule; Zaditor 0.025 % (0.035 %) ophthalmic (eye) drops         Allergy List  Molds; PENICILLINS         Family Medical History  Stroke; Heart Disease; Congestive Heart Failure; Cancer, Unspecified; Diabetes, unspecified type; Skin Carcinoma; Family history of COPD (chronic obstructive pulmonary  "disease); Family history of Arthritis         Social History  Alcohol Use (Never); Claustophobic (Unknown); lives with other; Recreational Drug Use (Never); Single.; Tobacco (Never); Tobacco use; Working         Review of Systems  · Constitutional  o Denies  o : fever, chills, weight loss  · Cardiovascular  o Denies  o : chest pain, shortness of breath  · Gastrointestinal  o Denies  o : liver disease, heartburn, nausea, blood in stools  · Genitourinary  o Denies  o : painful urination, blood in urine  · Integument  o Denies  o : rash, itching  · Neurologic  o Denies  o : headache, weakness, loss of consciousness  · Musculoskeletal  o Denies  o : painful, swollen joints  · Psychiatric  o Denies  o : drug/alcohol addiction, anxiety, depression      Vitals  Date Time BP Position Site L\R Cuff Size HR RR TEMP (F) WT  HT  BMI kg/m2 BSA m2 O2 Sat FR L/min FiO2 HC       01/26/2021 11:06 AM      67 - R   214lbs 16oz 5'  2\" 39.32 2.07 95 %            Physical Examination  · Constitutional  o Appearance  o : well developed, well-nourished, no obvious deformities present  · Head and Face  o Head  o :   § Inspection  § : normocephalic  o Face  o :   § Inspection  § : no facial lesions  · Eyes  o Conjunctivae  o : conjunctivae normal  o Sclerae  o : sclerae white  · Ears, Nose, Mouth and Throat  o Ears  o :   § External Ears  § : appearance within normal limits  § Hearing  § : intact  o Nose  o :   § External Nose  § : appearance normal  · Neck  o Inspection/Palpation  o : normal appearance  o Range of Motion  o : full range of motion  · Respiratory  o Respiratory Effort  o : breathing unlabored  o Inspection of Chest  o : normal appearance  o Auscultation of Lungs  o : no audible wheezing or rales  · Cardiovascular  o Heart  o : regular rate  · Gastrointestinal  o Abdominal Examination  o : soft and non-tender  · Skin and Subcutaneous Tissue  o General Inspection  o : intact, no rashes  · Psychiatric  o General  o : Alert and " oriented x3  o Judgement and Insight  o : judgment and insight intact  o Mood and Affect  o : mood normal, affect appropriate  · Right Knee-Street  o Inspection  o : no limping gait, weight bearing, swelling present, no ecchymosis, no atrophy, neutral alignment  o Palpation  o : no medial joint line tenderness, no lateral joint line tenderness, no patellar tendon tenderness, no pain of MCL, no pain at LCL  o ROM  o : full extension, full flexion  o Strength  o : full extension, full flexion  o Special Tests  o : negative varus stress, negaitve valgus stress  o Neurovascular  o : Full sensation, Dorsal Pedal Pulse 2+, posteriror tibialis pulse 2+          Assessment  · Primary osteoarthritis of right knee     715.16/M17.11  · Pain: Knee     719.46/M25.569      Plan  · Medications  o Medications have been Reconciled  o Transition of Care or Provider Policy  · Instructions  o Reviewed the patient's Past Medical, Social, and Family history as well as the ROS at today's visit, no changes.  o Call or return if worsening symptoms.  o Follow Up PRN.  o Electronically Identified Patient Education Materials Provided Electronically     Prescribed Voltaren 75mg one po BID #[id number]             Electronically Signed by: MARTHA Herrera-PRIETO -Author on January 26, 2021 11:29:30 AM  Electronically Co-signed by: Natalie Marques MD -Reviewer on January 26, 2021 09:49:14 PM

## 2021-05-14 NOTE — PROGRESS NOTES
Progress Note      Patient Name: Tiffani Stacy   Patient ID: [id number]   Sex: Female   YOB: 1960    Primary Care Provider: Ema HASSAN   Referring Provider: Ema HASSAN    Visit Date: December 15, 2020    Provider: Natalie Marques MD   Location: [facility]   Location Address: [address] [place] [address]  [id number]   Location Phone: [phone]          Chief Complaint  · Right Knee Osteoarthritis      History Of Present Illness  Tiffani Stacy is a 60 year old /White female who presents today to [facility].      Patient presents today with a follow-up of right knee pain. Patient has a history of right knee osteoarthritis. Patient is unable to attend physical therapy at this time as she cannot afford it because she has been off work. Patient has been having right knee pain that has been ongoing for some time with no known injury or trauma. Patient presents today to get Synvisc injection in her right knee. She has no new complaints today. 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 Patient has been switching back and forth between Mobic and Diclofenac.       Past Medical History  Aortic valve stenosis; Arthritis; Chest pain; Disc degeneration, lumbar; Heart Disease; HTN (hypertension); Hypertension; Limb Swelling; Reflux; Seasonal allergies; Shortness of Breath; Ulcer         Past Surgical History  Cholecystectomy; Eye Implant; Gallbladder; Hysterectomy; Neck         Medication List  Alive Multi Vit; bupropion HCl 300 mg oral tablet extended release 24 hr; Celexa 20 mg oral tablet; cetirizine 10 mg oral tablet; diclofenac sodium 75 mg oral tablet,delayed release (DR/EC); Miriam-C 500 mg oral; Flonase Allergy Relief 50 mcg/actuation nasal spray,suspension; losartan-hydrochlorothiazide 100-25 mg oral tablet; Lumigan 0.01 % ophthalmic (eye) drops; Nexium 20 mg oral capsule,delayed release(DR/EC); vitamin E 400 unit oral capsule; Zaditor 0.025 %  "(0.035 %) ophthalmic (eye) drops         Allergy List  Molds; PENICILLINS       Allergies Reconciled  Family Medical History  Stroke; Heart Disease; Congestive Heart Failure; Cancer, Unspecified; Diabetes, unspecified type; Skin Carcinoma; Family history of COPD (chronic obstructive pulmonary disease); Family history of Arthritis         Social History  Alcohol Use (Never); Claustophobic (Unknown); lives with other; Recreational Drug Use (Never); Single.; Tobacco (Never); Tobacco use; Working         Review of Systems  · Constitutional  o Denies  o : fever, chills, weight loss  · Cardiovascular  o Denies  o : chest pain, shortness of breath  · Gastrointestinal  o Denies  o : liver disease, heartburn, nausea, blood in stools  · Genitourinary  o Denies  o : painful urination, blood in urine  · Integument  o Denies  o : rash, itching  · Neurologic  o Denies  o : headache, weakness, loss of consciousness  · Musculoskeletal  o Denies  o : painful, swollen joints  · Psychiatric  o Denies  o : drug/alcohol addiction, anxiety, depression      Vitals  Date Time BP Position Site L\R Cuff Size HR RR TEMP (F) WT  HT  BMI kg/m2 BSA m2 O2 Sat FR L/min FiO2        12/15/2020 11:06 AM         218lbs 0oz 5'  2\" 39.87 2.08             Physical Examination  · Constitutional  o Appearance  o : well developed, well-nourished, no obvious deformities present  · Head and Face  o Head  o :   § Inspection  § : normocephalic  o Face  o :   § Inspection  § : no facial lesions  · Eyes  o Conjunctivae  o : conjunctivae normal  o Sclerae  o : sclerae white  · Ears, Nose, Mouth and Throat  o Ears  o :   § External Ears  § : appearance within normal limits  § Hearing  § : intact  o Nose  o :   § External Nose  § : appearance normal  · Neck  o Inspection/Palpation  o : normal appearance  o Range of Motion  o : full range of motion  · Respiratory  o Respiratory Effort  o : breathing unlabored  o Inspection of Chest  o : normal " appearance  o Auscultation of Lungs  o : no audible wheezing or rales  · Cardiovascular  o Heart  o : regular rate  · Gastrointestinal  o Abdominal Examination  o : soft and non-tender  · Skin and Subcutaneous Tissue  o General Inspection  o : intact, no rashes  · Psychiatric  o General  o : Alert and oriented x3  o Judgement and Insight  o : judgment and insight intact  o Mood and Affect  o : mood normal, affect appropriate  · Right Knee  o Inspection  o : Sensation grossly intact. Neurovascular intact. Skin intact. Calf supple, non-tender. Dorsal Pedal Pulse 2+, posterior tibialis pulse 2+. No swelling, skin discoloration or atrophy. Positive crepitus. Tender medial and lateral joint line. Non-tender patella tendon. Pain with ROM. Near full flexion and extension. Stable to valgus/varus stress. Good strength in quadriceps, hamstrings, dorsiflexors, and plantar flexors.  · Injection Note/Aspiration Note  o Site  o : right knee  o Procedure  o : Procedure: After educating the patient, patient gave consent for procedure. After using Chloraprep, the joint space was injected. The patient tolerated the procedure well.   o Medication  o : Synvisc One 48 mg           Assessment  · Primary osteoarthritis of right knee     715.16/M17.11      Plan  · Orders  o Hyaluronan or derivative, Synvisc or Synvisc-One, for intra-melissa () - 715.16/M17.11 - 12/15/2020   Lot [id number] 02 2023 [employer] Administered by ALMA DELIA Marques MD  o Knee Intra-articular Injection without US Guidance [facility] ([id number]) - 715.16/M17.11 - 12/15/2020  · Medications  o Medications have been Reconciled  o Transition of Care or Provider Policy  · Instructions  o Dr. Marques saw and examined the patient and agrees with plan.   o Reviewed the patient's Past Medical, Social, and Family history as well as the ROS at today's visit, no changes.  o Call or return if worsening symptoms.  o Follow up in 6 weeks.  o This note was transcribed by Deann Heath. mc  o Patient  received Synvisc injection in her right knee and tolerated it well. She will follow-up with us if her pain worsens or doesn't improve. Patient given a note to be off work for this week.            Electronically Signed by: Deann Heath-, Other -Author on December 17, 2020 10:17:02 AM  Electronically Co-signed by: Natalie Marques MD -Reviewer on December 18, 2020 03:20:10 PM

## 2021-05-15 VITALS — SYSTOLIC BLOOD PRESSURE: 140 MMHG | DIASTOLIC BLOOD PRESSURE: 72 MMHG

## 2021-05-15 VITALS
WEIGHT: 215 LBS | HEART RATE: 86 BPM | DIASTOLIC BLOOD PRESSURE: 74 MMHG | BODY MASS INDEX: 39.56 KG/M2 | HEIGHT: 62 IN | SYSTOLIC BLOOD PRESSURE: 150 MMHG

## 2021-05-16 VITALS — HEIGHT: 62 IN | WEIGHT: 197 LBS | BODY MASS INDEX: 36.25 KG/M2 | RESPIRATION RATE: 16 BRPM

## 2021-05-16 VITALS — WEIGHT: 197 LBS | BODY MASS INDEX: 36.25 KG/M2 | HEIGHT: 62 IN | RESPIRATION RATE: 16 BRPM

## 2021-05-20 ENCOUNTER — OFFICE VISIT CONVERTED (OUTPATIENT)
Dept: ORTHOPEDIC SURGERY | Facility: CLINIC | Age: 61
End: 2021-05-20
Attending: ORTHOPAEDIC SURGERY

## 2021-06-05 NOTE — PROGRESS NOTES
Progress Note      Patient Name: Tiffani Stacy   Patient ID: [id number]   Sex: Female   YOB: 1960    Primary Care Provider: Ema HASSAN   Referring Provider: Ema HASSAN    Visit Date: May 20, 2021    Provider: Natalie Marques MD   Location: [facility]   Location Address: [address] [place] [address]  [id number]   Location Phone: [phone]          Chief Complaint  · Bilateral Knee Pain      History Of Present Illness  Tiffani Stacy is a 60 year old /White female who presents today to [facility].      Patient presents today for an evaluation of bilateral knees. Patient has a history of right knee osteoarthritis that she has been treating conservatively. Patient states that Synvisc injection from December that has provided her relief in her right knee for several months. She has been wearing a right knee brace and taking Diclofenac that does provide her with some relief but causes her to swell. She is inquiring about any other form of medication she can take.       Past Medical History  Aortic valve stenosis; Arthritis; Chest pain; Disc degeneration, lumbar; Heart Disease; HTN (hypertension); Hypertension; Limb Swelling; Reflux; Seasonal allergies; Shortness of Breath; Ulcer         Past Surgical History  Cholecystectomy; Eye Implant; Gallbladder; Hysterectomy; Neck         Medication List  Alive Multi Vit; bupropion HCl 300 mg oral tablet extended release 24 hr; Celexa 20 mg oral tablet; cetirizine 10 mg oral tablet; Miriam-C 500 mg oral; Flonase Allergy Relief 50 mcg/actuation nasal spray,suspension; losartan-hydrochlorothiazide 100-25 mg oral tablet; Lumigan 0.01 % ophthalmic (eye) drops; metoprolol tartrate 25 mg oral tablet; Naprosyn 500 mg oral tablet; Nexium 20 mg oral capsule,delayed release(DR/EC); vitamin E 400 unit oral capsule; Zaditor 0.025 % (0.035 %) ophthalmic (eye) drops         Allergy List  Molds; naproxen; PENICILLINS;  "Voltaren       Allergies Reconciled  Family Medical History  Stroke; Heart Disease; Congestive Heart Failure; Cancer, Unspecified; Diabetes, unspecified type; Skin Carcinoma; Family history of COPD (chronic obstructive pulmonary disease); Family history of Arthritis         Social History  Alcohol Use (Never); Claustophobic (Unknown); lives with other; Recreational Drug Use (Never); Single.; Tobacco (Never); Tobacco use; Working         Review of Systems  · Constitutional  o Denies  o : fever, chills, weight loss  · Cardiovascular  o Denies  o : chest pain, shortness of breath  · Gastrointestinal  o Denies  o : liver disease, heartburn, nausea, blood in stools  · Genitourinary  o Denies  o : painful urination, blood in urine  · Integument  o Denies  o : rash, itching  · Neurologic  o Denies  o : headache, weakness, loss of consciousness  · Musculoskeletal  o Denies  o : painful, swollen joints  · Psychiatric  o Denies  o : drug/alcohol addiction, anxiety, depression      Vitals  Date Time BP Position Site L\R Cuff Size HR RR TEMP (F) WT  HT  BMI kg/m2 BSA m2 O2 Sat FR L/min FiO2        05/20/2021 09:19 AM         214lbs 16oz 5'  2\" 39.32 2.07             Physical Examination  · Constitutional  o Appearance  o : well developed, well-nourished, no obvious deformities present  · Head and Face  o Head  o :   § Inspection  § : normocephalic  o Face  o :   § Inspection  § : no facial lesions  · Eyes  o Conjunctivae  o : conjunctivae normal  o Sclerae  o : sclerae white  · Ears, Nose, Mouth and Throat  o Ears  o :   § External Ears  § : appearance within normal limits  § Hearing  § : intact  o Nose  o :   § External Nose  § : appearance normal  · Neck  o Inspection/Palpation  o : normal appearance  o Range of Motion  o : full range of motion  · Respiratory  o Respiratory Effort  o : breathing unlabored  o Inspection of Chest  o : normal appearance  o Auscultation of Lungs  o : no audible wheezing or " rales  · Cardiovascular  o Heart  o : regular rate  · Gastrointestinal  o Abdominal Examination  o : soft and non-tender  · Skin and Subcutaneous Tissue  o General Inspection  o : intact, no rashes  · Psychiatric  o General  o : Alert and oriented x3  o Judgement and Insight  o : judgment and insight intact  o Mood and Affect  o : mood normal, affect appropriate  · Right Knee  o Inspection  o : Limping gait. Full weight bearing. Medial joint line tenderness. Active range of motion from 0-100 degrees of flexion. Moderate swelling. Calf supple, non-tender. Neurovascular intact. Sensation grossly intact.   · Left Knee  o Inspection  o : Limping gait. Full weight bearing. Active range of motion from 0-125. Calf supple, non-tender. Neurovascular intact. Sensation grossly intact.   · In Office Procedures  o View  o : LAT/SUNRISE/STANDING   o Site  o : bilateral, knee  o Indication  o : Bilateral Knee Pain  o Study  o : X-rays ordered, taken in the office, and reviewed today.  o Xray  o : Mild degenerative changes of the left knee with no signs of fracture or dislocation. Right knee demonstrates advanced degenerative changes consistent with severe right knee osteoarthritis, worse in the medial compartment.           Assessment  · Primary osteoarthritis of right knee     715.16/M17.11  · Primary osteoarthritis of left knee     715.16/M17.12  · Pain in both knees, unspecified chronicity       Pain in right knee     719.46/M25.561  Pain in left knee     719.46/M25.562      Plan  · Orders  o Knee (Left) [facility] ([id number]) - 719.46/M25.562 - 05/20/2021  o Knee (Right) [facility] ([id number]) - 719.46/M25.561 - 05/20/2021  · Medications  o Medications have been Reconciled  o Transition of Care or Provider Policy  · Instructions  o Reviewed the patient's Past Medical, Social, and Family history as well as the ROS at today's visit, no changes.  o Call or return if worsening symptoms.  o Discussed  surgery.  o Risks/benefits discussed with patient including, but not limited to: infection, bleeding, neurovascular damage, malunion, nonunion, aesthetic deformity, need for further surgery, and death.  o Discussed with patient the implant type being used during surgery and patient understands and desires to proceed.  o X-ray ordered, taken and reviewed at this visit.  o Reviewed Xrays.  o Follow Up PRN.  o This note was transcribed by Deann Heath. ab/mc  o Discussed treatment plans and diagnosis with the patient. Patient was prescribed Naproxen to see if that will avoid her swelling. We will seek approval for viscosupplementation. She will follow-up if the visco is approved. She is unable to take steroids due to glaucoma. Patient is a candidate for a knee replacement, and she will consider this in the near future.             Electronically Signed by: Deann Heath-, Other -Author on May 20, 2021 03:40:57 PM  Electronically Co-signed by: Tonie Cordero PA-C -Reviewer on May 21, 2021 09:41:25 AM  Electronically Co-signed by: Natalie Marques MD -Reviewer on May 26, 2021 08:19:21 PM

## 2021-06-10 ENCOUNTER — TELEPHONE (OUTPATIENT)
Dept: ORTHOPEDIC SURGERY | Facility: CLINIC | Age: 61
End: 2021-06-10

## 2021-06-10 NOTE — TELEPHONE ENCOUNTER
SYNVISC ONE, RIGHT KNEE, APPROVED, AMINA  Received: Yesterday  Chasidy Ludwig, Cherelle, Regina Rep  Synvisc One Approved by Tariq for the right knee. Effective 06/07/21-06/28/21. Patient last Synvisc injection 12/15/20, can have repeat injection after 06/15/21, please push importance of keeping appt with pt due to auth exp 06/28/21.       CALLED THE PATIENT AND HER APPT IS ON 6-22 AT 3:00 PM RT KNEE, SYNVISC ONE, TO SEE GREGG.

## 2021-06-22 ENCOUNTER — OFFICE VISIT (OUTPATIENT)
Dept: ORTHOPEDIC SURGERY | Facility: CLINIC | Age: 61
End: 2021-06-22

## 2021-06-22 VITALS — HEART RATE: 68 BPM | WEIGHT: 227 LBS | BODY MASS INDEX: 41.77 KG/M2 | HEIGHT: 62 IN | OXYGEN SATURATION: 96 %

## 2021-06-22 DIAGNOSIS — M25.561 CHRONIC PAIN OF RIGHT KNEE: Primary | ICD-10-CM

## 2021-06-22 DIAGNOSIS — M17.11 PRIMARY LOCALIZED OSTEOARTHROSIS OF RIGHT LOWER LEG: ICD-10-CM

## 2021-06-22 DIAGNOSIS — G89.29 CHRONIC PAIN OF RIGHT KNEE: Primary | ICD-10-CM

## 2021-06-22 PROCEDURE — 99214 OFFICE O/P EST MOD 30 MIN: CPT | Performed by: PHYSICIAN ASSISTANT

## 2021-06-22 PROCEDURE — 20610 DRAIN/INJ JOINT/BURSA W/O US: CPT | Performed by: PHYSICIAN ASSISTANT

## 2021-06-22 RX ORDER — BUSPIRONE HYDROCHLORIDE 15 MG/1
TABLET ORAL
COMMUNITY
Start: 2021-06-09 | End: 2022-01-12

## 2021-06-22 RX ORDER — ASCORBIC ACID 100 MG
TABLET,CHEWABLE ORAL
COMMUNITY
End: 2022-04-11

## 2021-06-22 RX ORDER — BUPROPION HYDROCHLORIDE 300 MG/1
300 TABLET ORAL EVERY MORNING
COMMUNITY

## 2021-06-22 RX ORDER — KETOTIFEN FUMARATE 0.35 MG/ML
2 SOLUTION/ DROPS OPHTHALMIC 2 TIMES DAILY
COMMUNITY

## 2021-06-22 RX ORDER — FLUTICASONE PROPIONATE 50 MCG
2 SPRAY, SUSPENSION (ML) NASAL DAILY PRN
COMMUNITY

## 2021-06-22 RX ORDER — LOSARTAN POTASSIUM AND HYDROCHLOROTHIAZIDE 12.5; 5 MG/1; MG/1
1 TABLET ORAL DAILY
COMMUNITY
Start: 2021-05-29

## 2021-06-22 RX ORDER — DICLOFENAC SODIUM 75 MG/1
75 TABLET, DELAYED RELEASE ORAL DAILY
COMMUNITY
Start: 2021-04-19 | End: 2022-03-17 | Stop reason: SDUPTHER

## 2021-06-22 RX ORDER — CETIRIZINE HYDROCHLORIDE 10 MG/1
10 TABLET ORAL NIGHTLY
COMMUNITY

## 2021-06-22 NOTE — ASSESSMENT & PLAN NOTE
Patient opted to receive Synvisc injection today, tolerated well, will follow up as needed in the future.

## 2021-06-22 NOTE — PATIENT INSTRUCTIONS
Rest, ice, elevate leg over the next 1 to 2 days.  If you develop any new or worsening symptoms please call our office.  Follow-up as needed in the future.

## 2021-06-22 NOTE — PROGRESS NOTES
"Chief Complaint  Pain of the Right Knee    Subjective          Tiffani Stacy presents to [facility] for right knee pain.  She is a longstanding history of right knee osteoarthritis.  Patient is wearing a brace in the office today.  She states she takes diclofenac for pain.  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  She feels like her knee is unstable when she is walking, has crepitus with ambulation as well.    Objective   Vital Signs:   Pulse 68   Ht 157.5 cm (62\")   Wt 103 kg (227 lb)   SpO2 96%   BMI 41.52 kg/m²       Physical Exam  Constitutional:       Appearance: Normal appearance. He is well-developed and normal weight.   HENT:      Head: Normocephalic.      Right Ear: Hearing and external ear normal.      Left Ear: Hearing and external ear normal.      Nose: Nose normal.   Eyes:      Conjunctiva/sclera: Conjunctivae normal.   Cardiovascular:      Rate and Rhythm: Normal rate.   Pulmonary:      Effort: Pulmonary effort is normal.      Breath sounds: No wheezing or rales.   Abdominal:      Palpations: Abdomen is soft.      Tenderness: There is no abdominal tenderness.   Musculoskeletal:      Cervical back: Normal range of motion.   Skin:     Findings: No rash.   Neurological:      Mental Status: He is alert and oriented to person, place, and time.   Psychiatric:         Mood and Affect: Mood and affect normal.         Judgment: Judgment normal.     Ortho Exam  Right knee: Skin intact, neurovascularly intact, dorsalis pedis pulses 2+ bilaterally.  Patient has full flexion and extension but there is crepitus with range of motion, gait is antalgic, full range of motion right ankle and right digits on right lower extremity.  Result Review :            Imaging Results (Most Recent)     None         Large Joint Arthrocentesis: R knee  Date/Time: 6/22/2021 3:46 PM  Consent given by: patient  Site marked: " site marked  Timeout: Immediately prior to procedure a time out was called to verify the correct patient, procedure, equipment, support staff and site/side marked as required   Supporting Documentation  Indications: pain   Procedure Details  Location: knee - R knee  Needle gauge: 21 G.  Medications administered: 48 mg hylan 48 MG/6ML              Assessment and Plan    Problem List Items Addressed This Visit        Musculoskeletal and Injuries    Primary localized osteoarthrosis of right lower leg    Chronic pain of right knee - Primary    Current Assessment & Plan     Patient opted to receive Synvisc injection today, tolerated well, will follow up as needed in the future.               Follow Up   Return if symptoms worsen or fail to improve.  Patient Instructions   Rest, ice, elevate leg over the next 1 to 2 days.  If you develop any new or worsening symptoms please call our office.  Follow-up as needed in the future.    Patient was given instructions and counseling regarding her condition or for health maintenance advice. Please see specific information pulled into the AVS if appropriate.

## 2021-07-01 ENCOUNTER — OFFICE VISIT (OUTPATIENT)
Dept: ORTHOPEDIC SURGERY | Facility: CLINIC | Age: 61
End: 2021-07-01

## 2021-07-01 VITALS — HEART RATE: 100 BPM | OXYGEN SATURATION: 95 % | HEIGHT: 62 IN | BODY MASS INDEX: 41.77 KG/M2 | WEIGHT: 227 LBS

## 2021-07-01 DIAGNOSIS — M25.561 CHRONIC PAIN OF RIGHT KNEE: Primary | ICD-10-CM

## 2021-07-01 DIAGNOSIS — G89.29 CHRONIC PAIN OF RIGHT KNEE: Primary | ICD-10-CM

## 2021-07-01 PROCEDURE — 99213 OFFICE O/P EST LOW 20 MIN: CPT | Performed by: PHYSICIAN ASSISTANT

## 2021-07-01 NOTE — PROGRESS NOTES
"Chief Complaint  Pain of the Right Knee    Subjective          Tiffani Stacy presents to [facility] for right knee pain.  She received right knee Synvisc injection last week.  She states that it has slightly helped but she feels like the pain is getting worse and worse with time.  She states \"I know I need a knee replacement.\"  She states that when she gets to work for her 12-hour shifts she has been in excruciating pain within 2 to 4 hours.  She has been wearing a knee brace.  She takes diclofenac for pain.  She is in resting icing and elevating the leg.  It just doesn't seem to be getting better.  Her primary doctor prescribed her tramadol that she occasionally has to take if it gets severe.  She states she cannot take steroids due to glaucoma history.    Objective   Vital Signs:   Pulse 100   Ht 157.5 cm (62\")   Wt 103 kg (227 lb)   SpO2 95%   BMI 41.52 kg/m²       Physical Exam  Constitutional:       Appearance: Normal appearance. He is well-developed and normal weight.   HENT:      Head: Normocephalic.      Right Ear: Hearing and external ear normal.      Left Ear: Hearing and external ear normal.      Nose: Nose normal.   Eyes:      Conjunctiva/sclera: Conjunctivae normal.   Cardiovascular:      Rate and Rhythm: Normal rate.   Pulmonary:      Effort: Pulmonary effort is normal.      Breath sounds: No wheezing or rales.   Abdominal:      Palpations: Abdomen is soft.      Tenderness: There is no abdominal tenderness.   Musculoskeletal:      Cervical back: Normal range of motion.   Skin:     Findings: No rash.   Neurological:      Mental Status: He is alert and oriented to person, place, and time.   Psychiatric:         Mood and Affect: Mood and affect normal.         Judgment: Judgment normal.     Ortho Exam  Right knee: Skin intact, moderate swelling, full flexion, full extension, gait is mildly antalgic, dorsalis pedis pulses 2+, sensation to light touch is intact in the " lower extremities, stable to varus and valgus stress.  Result Review :{ Labs  Result Review  Imaging  Med Tab  Media :23}            Imaging Results (Most Recent)     None                Assessment and Plan    Problem List Items Addressed This Visit     None          Follow Up   Return in about 4 weeks (around 7/29/2021) for Recheck.  Patient Instructions   Follow-up in 4 to 6 weeks, rest ice and elevate, continue home medications, may alternate diclofenac and Tylenol.    Patient was given instructions and counseling regarding her condition or for health maintenance advice. Please see specific information pulled into the AVS if appropriate.

## 2021-07-01 NOTE — PATIENT INSTRUCTIONS
Follow-up in 4 to 6 weeks, rest ice and elevate, continue home medications, may alternate diclofenac and Tylenol.

## 2021-07-01 NOTE — ASSESSMENT & PLAN NOTE
Given patient's severe amount of pain, we will have her stay off work to rest her leg for the next few weeks, she will follow-up in a few weeks time so we can reassess, determine if she is ready back for work with or without restrictions.

## 2021-07-15 VITALS — BODY MASS INDEX: 39.56 KG/M2 | HEIGHT: 62 IN | WEIGHT: 215 LBS

## 2021-07-29 ENCOUNTER — OFFICE VISIT (OUTPATIENT)
Dept: ORTHOPEDIC SURGERY | Facility: CLINIC | Age: 61
End: 2021-07-29

## 2021-07-29 VITALS — WEIGHT: 223 LBS | OXYGEN SATURATION: 97 % | BODY MASS INDEX: 41.04 KG/M2 | HEIGHT: 62 IN | HEART RATE: 81 BPM

## 2021-07-29 DIAGNOSIS — M17.11 PRIMARY LOCALIZED OSTEOARTHROSIS OF RIGHT LOWER LEG: Primary | ICD-10-CM

## 2021-07-29 PROCEDURE — 99212 OFFICE O/P EST SF 10 MIN: CPT | Performed by: PHYSICIAN ASSISTANT

## 2021-07-29 NOTE — PATIENT INSTRUCTIONS
Patient given work note to be off of work for the following 3 weeks to allow inflammation to subside in her knee.   Able to return to work thereafter, with use of brace.   Continue HEP.   She is to call with any changes or concerns.   Patient invited to follow up on an as needed basis.

## 2021-07-29 NOTE — PROGRESS NOTES
"Chief Complaint  Pain of the Right Knee    Subjective          Tiffani Stacy presents to [facility] for follow up of right knee pain, right knee osteoarthritis. Patient received Synvisc injection of her knee on 06-22-21, she states it helps with lubrication of her knee, but does not help long periods of standing. She is candidate for right TKA. Patient is unable to take steroid injections, due to history of glaucoma. 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 She states she wears a brace, which seems to help. She has been off of work for the past 4 weeks and states her knee pain has lessened since then.     Objective   Vital Signs:   Pulse 81   Ht 157.5 cm (62\")   Wt 101 kg (223 lb)   SpO2 97%   BMI 40.79 kg/m²       Physical Exam  Constitutional:       Appearance: Normal appearance. He is well-developed and normal weight.   HENT:      Head: Normocephalic.      Right Ear: Hearing and external ear normal.      Left Ear: Hearing and external ear normal.      Nose: Nose normal.   Eyes:      Conjunctiva/sclera: Conjunctivae normal.   Cardiovascular:      Rate and Rhythm: Normal rate.   Pulmonary:      Effort: Pulmonary effort is normal.      Breath sounds: No wheezing or rales.   Abdominal:      Palpations: Abdomen is soft.      Tenderness: There is no abdominal tenderness.   Musculoskeletal:      Cervical back: Normal range of motion.   Skin:     Findings: No rash.   Neurological:      Mental Status: He is alert and oriented to person, place, and time.   Psychiatric:         Mood and Affect: Mood and affect normal.         Judgment: Judgment normal.     Ortho Exam  Right knee: Tenderness the medial joint line.  Tenderness lateral joint line.  Full weightbearing.  Mildly antalgic gait.  Stable to varus valgus stress.  Good muscle tone the quadriceps and hamstrings muscles.  Sensation is intact.  Neurovascular intact.  Posterior tibialis pulse 2+.  Dorsalis pedis pulse " 2+.    Result Review :   The following data was reviewed by: MARTHA Jenkins on 07/29/2021:         Imaging Results (Most Recent)     None                Assessment and Plan    Problem List Items Addressed This Visit        Musculoskeletal and Injuries    Primary localized osteoarthrosis of right lower leg - Primary          Follow Up   Return if symptoms worsen or fail to improve.  Patient Instructions   Patient given work note to be off of work for the following 3 weeks to allow inflammation to subside in her knee.   Able to return to work thereafter, with use of brace.   Continue HEP.   She is to call with any changes or concerns.   Patient invited to follow up on an as needed basis.     Patient was given instructions and counseling regarding her condition or for health maintenance advice. Please see specific information pulled into the AVS if appropriate.

## 2022-01-12 ENCOUNTER — OFFICE VISIT (OUTPATIENT)
Dept: GASTROENTEROLOGY | Facility: CLINIC | Age: 62
End: 2022-01-12

## 2022-01-12 VITALS
HEART RATE: 64 BPM | DIASTOLIC BLOOD PRESSURE: 64 MMHG | HEIGHT: 62 IN | BODY MASS INDEX: 39.96 KG/M2 | WEIGHT: 217.15 LBS | SYSTOLIC BLOOD PRESSURE: 142 MMHG

## 2022-01-12 DIAGNOSIS — R10.13 EPIGASTRIC PAIN: ICD-10-CM

## 2022-01-12 DIAGNOSIS — Z12.11 COLON CANCER SCREENING: ICD-10-CM

## 2022-01-12 DIAGNOSIS — R12 HEARTBURN: Primary | ICD-10-CM

## 2022-01-12 PROCEDURE — 99204 OFFICE O/P NEW MOD 45 MIN: CPT | Performed by: NURSE PRACTITIONER

## 2022-01-12 RX ORDER — ESOMEPRAZOLE MAGNESIUM 40 MG/1
40 CAPSULE, DELAYED RELEASE ORAL DAILY
Qty: 90 CAPSULE | Refills: 1 | Status: SHIPPED | OUTPATIENT
Start: 2022-01-12 | End: 2022-07-14

## 2022-01-12 RX ORDER — POLYETHYLENE GLYCOL 3350, SODIUM CHLORIDE, SODIUM BICARBONATE, POTASSIUM CHLORIDE 420; 11.2; 5.72; 1.48 G/4L; G/4L; G/4L; G/4L
4000 POWDER, FOR SOLUTION ORAL ONCE
Qty: 4000 ML | Refills: 0 | Status: SHIPPED | OUTPATIENT
Start: 2022-01-12 | End: 2022-01-12

## 2022-01-12 RX ORDER — TRAVOPROST OPHTHALMIC SOLUTION 0.04 MG/ML
SOLUTION OPHTHALMIC
COMMUNITY
Start: 2021-08-20 | End: 2022-10-25

## 2022-01-12 NOTE — PROGRESS NOTES
Patient Name: Tiffani Stacy   Visit Date: 01/12/2022   Patient ID: [id number]  Provider: SONYA Ruiz    Sex: female  Location:  Location Address:  Location Phone: [address] [place] [address] [phone]    YOB: 1960      Primary Care Provider Pratik Jung MD      Referring Provider: No ref. provider found        Chief Complaint  Heartburn (Reflux), Abdominal Pain (Mid Abdomen ), and GI Problem (Belching )    History of Present Illness  Tiffani Stacy is a 61 y.o. who presents to [facility] on referral from No ref. provider found for a gastroenterology evaluation of Heartburn (Reflux), Abdominal Pain (Mid Abdomen ), and GI Problem (Belching ).    Ms. Stacy Presents with complaints of acid reflux. Has been taking Nexium 20 mg for several years now and is no longer working. Previously tried Prilosec and Protonix as well with no relief. Occasional nausea without vomiting. Denies dysphagia. Epigastric pain waxes and wanes. Nothing makes the pain better or worse. Appetite has increased, weight stable. Drinking multiple cups of caffeine daily. Avoids NSAIDS and limits spicy/acidic foods.     Having a bowel movement several times a week. Denies any hematochezia, melena, or family hx of colon cancer. No prior colon cancer screening.    Labs Result Review Imaging    Past Medical History:   Diagnosis Date   • Acid reflux    • Aortic valve stenosis    • Arthritis    • Chest pain    • Condition not found     ULCER    • Disc degeneration, lumbar    • Heart disease    • HTN (hypertension)    • Limb swelling    • Seasonal allergies    • Shortness of breath        Past Surgical History:   Procedure Laterality Date   • CHOLECYSTECTOMY     • EYE SURGERY      EYE IMPLANT    • GALLBLADDER SURGERY     • HYSTERECTOMY     • NECK SURGERY      CYST REMOVAL    • UPPER GASTROINTESTINAL ENDOSCOPY      Over ten years ago         Current Outpatient  Medications:   •  Ascorbic Acid (Vitamin C) 100 MG chewable tablet, , Disp: , Rfl:   •  benzonatate (TESSALON) 200 MG capsule, Take 1 capsule by mouth 3 (Three) Times a Day As Needed for Cough., Disp: 30 capsule, Rfl: 0  •  brimonidine (ALPHAGAN P) 0.1 % solution ophthalmic solution, , Disp: , Rfl:   •  buPROPion XL (WELLBUTRIN XL) 300 MG 24 hr tablet, bupropion HCl 300 mg oral tablet extended release 24 hr take 1 tablet (300 mg) by oral route once daily   Active, Disp: , Rfl:   •  cetirizine (zyrTEC) 10 MG tablet, , Disp: , Rfl:   •  citalopram (CeleXA) 40 MG tablet, Take 40 mg by mouth Daily., Disp: , Rfl:   •  diclofenac (VOLTAREN) 75 MG EC tablet, Take 75 mg by mouth 2 (Two) Times a Day., Disp: , Rfl:   •  fluticasone (Flonase) 50 MCG/ACT nasal spray, , Disp: , Rfl:   •  ketotifen (Zaditor) 0.025 % ophthalmic solution, , Disp: , Rfl:   •  losartan-hydrochlorothiazide (HYZAAR) 50-12.5 MG per tablet, Take 1 tablet by mouth Daily., Disp: , Rfl:   •  metoprolol tartrate (LOPRESSOR) 25 MG tablet, metoprolol tartrate 25 mg oral tablet take 1 tablet (25 mg) by oral route 2 times per day 2/17/2021  Active, Disp: , Rfl:   •  Pediatric Multivit-Minerals-C (ALIVE MULTI-VITAMIN CHILDRENS PO), , Disp: , Rfl:   •  travoprost, BAK free, (Travatan Z) 0.004 % solution ophthalmic solution, , Disp: , Rfl:   •  vitamin E 100 UNIT capsule, , Disp: , Rfl:   •  esomeprazole (nexIUM) 40 MG capsule, Take 1 capsule by mouth Daily., Disp: 90 capsule, Rfl: 1  •  polyethylene glycol-electrolytes (Nulytely with Flavor Packs) 420 g solution, Take 4,000 mL by mouth 1 (One) Time for 1 dose., Disp: 4000 mL, Rfl: 0     Allergies   Allergen Reactions   • Molds & Smuts Anaphylaxis   • Naproxen Swelling   • Trichophyton Itching   • Adhesive Tape Rash   • Penicillins Rash       Family History   Problem Relation Age of Onset   • Heart disease Mother    • Cancer Mother    • Arthritis Mother    • Stroke Father    • Heart disease Father    • Diabetes  "Father    • Arthritis Father    • Heart failure Other    • Other Other         SKIN CARCINOMA    • COPD Other         Social History     Social History Narrative    CLAUSTROPHOBIC - YES     EXPOSED TO SECOND HAND SMOKE - 10/3/16          Objective     Review of Systems   Constitutional: Positive for appetite change. Negative for unexpected weight loss.   Gastrointestinal: Positive for abdominal pain, nausea and indigestion.        Vital Signs:   /64 (BP Location: Left arm, Patient Position: Sitting, Cuff Size: Large Adult)   Pulse 64   Ht 157.5 cm (62\")   Wt 98.5 kg (217 lb 2.5 oz)   BMI 39.72 kg/m²       Physical Exam  Constitutional:       General: She is not in acute distress.     Appearance: Normal appearance. She is well-developed and normal weight.   HENT:      Head: Normocephalic and atraumatic.   Eyes:      Conjunctiva/sclera: Conjunctivae normal.      Pupils: Pupils are equal, round, and reactive to light.      Visual Fields: Right eye visual fields normal and left eye visual fields normal.   Cardiovascular:      Rate and Rhythm: Normal rate and regular rhythm.      Heart sounds: Normal heart sounds.   Pulmonary:      Effort: Pulmonary effort is normal. No retractions.      Breath sounds: Normal breath sounds and air entry.   Abdominal:      General: Bowel sounds are normal. There is no distension.      Palpations: Abdomen is soft.      Tenderness: There is no abdominal tenderness.      Comments: No appreciable hepatosplenomegaly or ascites   Musculoskeletal:         General: Normal range of motion.      Cervical back: Normal range of motion and neck supple.   Skin:     General: Skin is warm and dry.   Neurological:      Mental Status: She is alert and oriented to person, place, and time.   Psychiatric:         Mood and Affect: Mood and affect normal.         Behavior: Behavior normal.         Result Review :   The following data was reviewed by: SONYA Ruiz on 01/12/2022:      No " results found for: IRON, TIBC, FERRITIN, LABIRON           Assessment and Plan    Diagnoses and all orders for this visit:    1. Heartburn (Primary)  -     Case Request; Standing  -     Case Request    2. Epigastric pain  -     Case Request; Standing  -     Case Request    3. Colon cancer screening  -     Case Request; Standing  -     Case Request    Other orders  -     Follow Anesthesia Guidelines / Protocol; Future  -     Obtain Informed Consent; Future  -     polyethylene glycol-electrolytes (Nulytely with Flavor Packs) 420 g solution; Take 4,000 mL by mouth 1 (One) Time for 1 dose.  Dispense: 4000 mL; Refill: 0  -     esomeprazole (nexIUM) 40 MG capsule; Take 1 capsule by mouth Daily.  Dispense: 90 capsule; Refill: 1      ESOPHAGOGASTRODUODENOSCOPY (N/A), COLONOSCOPY (N/A)     Instructed patient to decrease caffeine usage.    Follow Up   Return for Follow up after procedure.  Patient was given instructions and counseling regarding her condition or for health maintenance advice. Please see specific information pulled into the AVS if appropriate.

## 2022-02-09 ENCOUNTER — OFFICE VISIT (OUTPATIENT)
Dept: CARDIOLOGY | Facility: CLINIC | Age: 62
End: 2022-02-09

## 2022-02-09 VITALS
WEIGHT: 216 LBS | HEART RATE: 56 BPM | DIASTOLIC BLOOD PRESSURE: 60 MMHG | BODY MASS INDEX: 39.75 KG/M2 | SYSTOLIC BLOOD PRESSURE: 137 MMHG | HEIGHT: 62 IN

## 2022-02-09 DIAGNOSIS — Q24.8 LEFT VENTRICULAR OUTFLOW TRACT OBSTRUCTION: Primary | ICD-10-CM

## 2022-02-09 DIAGNOSIS — I35.1 MODERATE AORTIC VALVE REGURGITATION: ICD-10-CM

## 2022-02-09 DIAGNOSIS — E66.01 SEVERE OBESITY (BMI 35.0-39.9) WITH COMORBIDITY: ICD-10-CM

## 2022-02-09 DIAGNOSIS — I10 ESSENTIAL HYPERTENSION: ICD-10-CM

## 2022-02-09 PROCEDURE — 99214 OFFICE O/P EST MOD 30 MIN: CPT | Performed by: INTERNAL MEDICINE

## 2022-02-09 RX ORDER — MULTIPLE VITAMINS W/ MINERALS TAB 9MG-400MCG
1 TAB ORAL DAILY
COMMUNITY

## 2022-02-09 NOTE — PROGRESS NOTES
Chief Complaint  Aortic Valve Stenosis    Subjective            Tiffani Stacy presents to [facility] CARDIOLOGY  History of Present Illness  Ms. Stacy presents for routine follow-up today and states she is feeling well with no new problems reported.  Her mild chronic exertional dyspnea remains stable and unchanged since her last visit.  She has not experienced any episodes of chest pain/pressure, palpitations, orthopnea, PND, peripheral edema, lightheadedness, or syncope.  Her exercise tolerance and physical activity level remained stable.  Unfortunately, she remains significantly overweight with a stable BMI of 39.5 today.  She has a history of mild LVOT obstruction due to basal septal hypertrophy (peak and mean gradients were 25 and 13 mmHg, respectively) noted on her previous echocardiogram in July 2020.  She was also noted to have moderate aortic regurgitation on that study.  A SPECT study also obtained in 2020 showed no evidence of ischemia or infarction with a calculated ejection fraction of 72% and normal left ventricular wall motion throughout.  Ms. Stacy states she is tolerating all her home medications well and her blood pressure is typically in the 120s-130s/60s-70s when checked at home.  Her BP was 137/60 in the office today.      Past Medical History:   Diagnosis Date   • Acid reflux    • Aortic valve stenosis    • Arthritis    • Chest pain    • Condition not found     ULCER    • Disc degeneration, lumbar    • Heart disease    • HTN (hypertension)    • Limb swelling    • Seasonal allergies    • Shortness of breath        Past Surgical History:   • CHOLECYSTECTOMY   • EYE SURGERY    EYE IMPLANT    • GALLBLADDER SURGERY   • HYSTERECTOMY   • NECK SURGERY    CYST REMOVAL    • UPPER GASTROINTESTINAL ENDOSCOPY    Over ten years ago       Social History     Tobacco Use   • Smoking status: Never Smoker   • Smokeless tobacco: Never Used   Vaping Use   • Vaping Use: Never used    Substance Use Topics   • Alcohol use: Never   • Drug use: Never       Family History   Problem Relation Age of Onset   • Heart disease Mother    • Cancer Mother    • Arthritis Mother    • Stroke Father    • Heart disease Father    • Diabetes Father    • Arthritis Father    • Heart failure Other    • Other Other         SKIN CARCINOMA    • COPD Other         Current Outpatient Medications on File Prior to Visit   Medication Sig   • Ascorbic Acid (Vitamin C) 100 MG chewable tablet Every 7 (Seven) Days.   • brimonidine (ALPHAGAN P) 0.1 % solution ophthalmic solution    • buPROPion XL (WELLBUTRIN XL) 300 MG 24 hr tablet bupropion HCl 300 mg oral tablet extended release 24 hr take 1 tablet (300 mg) by oral route once daily   Active   • cetirizine (zyrTEC) 10 MG tablet Daily.   • citalopram (CeleXA) 40 MG tablet Take 40 mg by mouth Daily.   • diclofenac (VOLTAREN) 75 MG EC tablet Take 75 mg by mouth Daily.   • esomeprazole (nexIUM) 40 MG capsule Take 1 capsule by mouth Daily.   • fluticasone (Flonase) 50 MCG/ACT nasal spray    • ketotifen (Zaditor) 0.025 % ophthalmic solution    • losartan-hydrochlorothiazide (HYZAAR) 50-12.5 MG per tablet Take 1 tablet by mouth Daily.   • metoprolol tartrate (LOPRESSOR) 25 MG tablet metoprolol tartrate 25 mg oral tablet take 1 tablet (25 mg) by oral route 2 times per day 2/17/2021  Active   • multivitamin with minerals (MULTIVITAMIN ADULT PO) Take 1 tablet by mouth Daily.   • travoprost, BAK free, (Travatan Z) 0.004 % solution ophthalmic solution    • vitamin E 100 UNIT capsule Daily.   • [DISCONTINUED] benzonatate (TESSALON) 200 MG capsule Take 1 capsule by mouth 3 (Three) Times a Day As Needed for Cough.   • [DISCONTINUED] Pediatric Multivit-Minerals-C (ALIVE MULTI-VITAMIN CHILDRENS PO)      No current facility-administered medications on file prior to visit.       Allergies   Allergen Reactions   • Molds & Smuts Anaphylaxis   • Naproxen Swelling   • Trichophyton Itching   • Adhesive  "Tape Rash   • Penicillins Rash       Review of Systems   Constitutional: Positive for fatigue. Negative for chills and fever.   HENT: Negative for hearing loss, nosebleeds and sore throat.    Eyes: Negative for pain and visual disturbance.   Respiratory: Positive for shortness of breath. Negative for cough and wheezing.    Cardiovascular: Negative for chest pain, palpitations and leg swelling.   Gastrointestinal: Negative for abdominal pain, blood in stool and vomiting.   Genitourinary: Negative for dysuria and hematuria.   Musculoskeletal: Positive for arthralgias. Negative for joint swelling and myalgias.   Skin: Negative for color change, pallor and rash.   Neurological: Negative for tremors, syncope, light-headedness and headache.        Objective     /60   Pulse 56   Ht 157.5 cm (62\")   Wt 98 kg (216 lb)   BMI 39.51 kg/m²       Physical Exam  Constitutional:       General: She is not in acute distress.     Appearance: Normal appearance.   HENT:      Head: Atraumatic.      Mouth/Throat:      Mouth: Mucous membranes are moist.      Pharynx: Oropharynx is clear. No oropharyngeal exudate.   Eyes:      General: No scleral icterus.     Conjunctiva/sclera: Conjunctivae normal.   Neck:      Vascular: No carotid bruit or JVD.   Cardiovascular:      Rate and Rhythm: Normal rate and regular rhythm.  No extrasystoles are present.     Chest Wall: PMI is not displaced.      Pulses:           Radial pulses are 2+ on the right side and 2+ on the left side.      Heart sounds: S1 normal and S2 normal. Murmur heard.    Systolic murmur is present with a grade of 2/6.  No friction rub. No gallop. No S3 sounds.       Comments: 2/6 systolic murmur at RUSB and LUSB with mild diastolic murmur noted at RUSB  Pulmonary:      Effort: Pulmonary effort is normal. No tachypnea or respiratory distress.      Breath sounds: No wheezing, rhonchi or rales.   Chest:      Chest wall: No tenderness.   Abdominal:      General: Bowel sounds " are normal. There is no distension.      Palpations: Abdomen is soft. There is no mass.      Tenderness: There is no abdominal tenderness.      Comments: Obese.   Musculoskeletal:         General: No swelling, tenderness or deformity.      Cervical back: Neck supple. No tenderness.      Right lower leg: No edema.      Left lower leg: No edema.   Skin:     General: Skin is warm and dry.      Coloration: Skin is not jaundiced.      Findings: No erythema or rash.      Nails: There is no clubbing.   Neurological:      General: No focal deficit present.      Mental Status: She is alert and oriented to person, place, and time.      Motor: No weakness.   Psychiatric:         Mood and Affect: Mood normal.         Behavior: Behavior normal.       Result Review :     The following data was reviewed by: Josué Tellez MD on 02/09/2022:    Echocardiogram 7/7/20:  CONCLUSIONS:  1.  Normal left ventricular systolic function with an estimated ejection fraction of 60 to 65% and normal left        ventricular wall motion throughout.   2.  Moderate basal septal hypertrophy with a sigmoid septum.  An increased gradient was observed across the        left ventricular outflow tract consistent with mild-moderate subvalvular stenosis.   3.  Moderate to severely dilated left atrium.   4.  Mildly thickened mitral valve leaflets with mild mitral regurgitation.   5.  Moderate aortic regurgitation.       Assessment and Plan      Diagnoses and all orders for this visit:    1. Left ventricular outflow tract obstruction (Primary)  -     Adult Transthoracic Echo Complete w/ Color, Spectral and Contrast if necessary per protocol; Future    2. Essential hypertension  -     Adult Transthoracic Echo Complete w/ Color, Spectral and Contrast if necessary per protocol; Future    3. Moderate aortic valve regurgitation  -     Adult Transthoracic Echo Complete w/ Color, Spectral and Contrast if necessary per protocol; Future    4. Severe obesity (BMI  35.0-39.9) with comorbidity (HCC)    -Mild LVOT obstruction due to basal septal hypertrophy:  Her previously reported symptoms have remained significantly improved on medical therapy with metoprolol; continue same.  We will plan to add verapamil to her regimen if she develops worsening symptoms.  We will repeat an echocardiogram a few weeks prior to her next visit in 1 year for reassessment of her LVOT gradient and aortic regurgitation.    -Hypertension:  Her BP remains adequately controlled on current doses of losartan, HCTZ, and metoprolol; continue same.    -Moderate aortic regurgitation: We will recheck an echocardiogram prior to her next visit as noted above.    -Obesity: BMI stable at 39.5 today.  I again recommended a 50-60 lb. weight loss and we reviewed dietary strategies and regimens to assist with this goal.  Increased aerobic exercise was strongly recommended.      Follow Up     Return in about 1 year (around 2/9/2023) for Next scheduled follow up.    Patient was given instructions and counseling regarding her condition or for health maintenance advice. Please see specific information pulled into the AVS if appropriate.     Tiffani Stacy  reports that she has never smoked. She has never used smokeless tobacco.. I have educated her on the risk of diseases from using tobacco products such as cancer, COPD and heart disease.

## 2022-03-11 ENCOUNTER — TELEPHONE (OUTPATIENT)
Dept: ORTHOPEDIC SURGERY | Facility: CLINIC | Age: 62
End: 2022-03-11

## 2022-03-11 NOTE — TELEPHONE ENCOUNTER
Provider: MO  Caller: AMERICA MILLS  Relationship to Patient: PATIENT  Pharmacy: N/A  Phone Number: [phone]  Reason for Call: PATIENT CALLING TO SCHEDULE SYNVISC FOR BILAT KNEE  When was the patient last seen: 7.29.21

## 2022-03-17 ENCOUNTER — OFFICE VISIT (OUTPATIENT)
Dept: ORTHOPEDIC SURGERY | Facility: CLINIC | Age: 62
End: 2022-03-17

## 2022-03-17 VITALS — WEIGHT: 218 LBS | BODY MASS INDEX: 40.12 KG/M2 | HEIGHT: 62 IN

## 2022-03-17 DIAGNOSIS — M17.11 PRIMARY LOCALIZED OSTEOARTHROSIS OF RIGHT LOWER LEG: Primary | ICD-10-CM

## 2022-03-17 PROCEDURE — 20610 DRAIN/INJ JOINT/BURSA W/O US: CPT | Performed by: ORTHOPAEDIC SURGERY

## 2022-03-17 RX ORDER — DICLOFENAC SODIUM 75 MG/1
75 TABLET, DELAYED RELEASE ORAL DAILY
Qty: 60 TABLET | Refills: 1 | Status: SHIPPED | OUTPATIENT
Start: 2022-03-17 | End: 2022-11-06 | Stop reason: HOSPADM

## 2022-03-17 RX ORDER — LATANOPROST 50 UG/ML
1 SOLUTION/ DROPS OPHTHALMIC NIGHTLY
COMMUNITY
Start: 2022-03-11

## 2022-03-17 NOTE — PROGRESS NOTES
"Chief Complaint  Follow-up of the Right Knee     Subjective      Tiffani Stacy presents to [facility] for follow-up of right knee pain, osteoarthritis. Patient reports she is not working at this time and the gel injections have been helping her longer. She reports doing well overall and is here today for her right knee Synvisc injection. She is not ready for replacement at this time. No new complaints.     Allergies   Allergen Reactions   • Molds & Smuts Anaphylaxis   • Naproxen Swelling   • Trichophyton Itching   • Adhesive Tape Rash   • Penicillins Rash        Social History     Socioeconomic History   • Marital status: Single   Tobacco Use   • Smoking status: Never Smoker   • Smokeless tobacco: Never Used   Vaping Use   • Vaping Use: Never used   Substance and Sexual Activity   • Alcohol use: Never   • Drug use: Never   • Sexual activity: Defer        Review of Systems     Objective   Vital Signs:   Ht 157.5 cm (62\")   Wt 98.9 kg (218 lb)   BMI 39.87 kg/m²       Physical Exam  Constitutional:       Appearance: Normal appearance. Patient is well-developed and normal weight.   HENT:      Head: Normocephalic.      Right Ear: Hearing and external ear normal.      Left Ear: Hearing and external ear normal.      Nose: Nose normal.   Eyes:      Conjunctiva/sclera: Conjunctivae normal.   Cardiovascular:      Rate and Rhythm: Normal rate.   Pulmonary:      Effort: Pulmonary effort is normal.      Breath sounds: No wheezing or rales.   Abdominal:      Palpations: Abdomen is soft.      Tenderness: There is no abdominal tenderness.   Musculoskeletal:      Cervical back: Normal range of motion.   Skin:     Findings: No rash.   Neurological:      Mental Status: Patient is alert and oriented to person, place, and time.   Psychiatric:         Mood and Affect: Mood and affect normal.         Judgment: Judgment normal.       Ortho Exam      Full extension, flexion 100 degrees, neuro intact, " sensation intact, pulses present, stable to varus and valgus stress, Stable to anterior and posterior drawer, negative Lachman's and Criss's, ambulates with mild antalgic gait, skin intact, no swelling, no skin discoloration      Large Joint Arthrocentesis: R knee  Date/Time: 3/17/2022 3:16 PM  Consent given by: patient  Site marked: site marked  Timeout: Immediately prior to procedure a time out was called to verify the correct patient, procedure, equipment, support staff and site/side marked as required   Supporting Documentation  Indications: pain   Procedure Details  Location: knee - R knee  Needle gauge: 21G.  Medications administered: 48 mg hylan 48 MG/6ML  Patient tolerance: patient tolerated the procedure well with no immediate complications            Imaging Results (Most Recent)     None           Result Review :             Assessment and Plan     DX: right knee osteoarthritis      Call or return if worsening symptoms.    Follow Up     Patient tolerated the right knee Synvisc injection well. Follow-up as needed.       Patient was given instructions and counseling regarding her condition or for health maintenance advice. Please see specific information pulled into the AVS if appropriate.     Scribed for Natalie Marques MD by Fernanda Vences.  03/17/22   15:07 EDT    I have personally performed the services described in this document as scribed by the above individual and it is both accurate and complete. Natalie Marques MD 03/18/22

## 2022-04-06 ENCOUNTER — TELEPHONE (OUTPATIENT)
Dept: GASTROENTEROLOGY | Facility: CLINIC | Age: 62
End: 2022-04-06

## 2022-04-11 RX ORDER — MULTIVIT WITH MINERALS/LUTEIN
1000 TABLET ORAL
COMMUNITY

## 2022-04-11 RX ORDER — HYDROXYZINE HYDROCHLORIDE 10 MG/1
15 TABLET, FILM COATED ORAL 3 TIMES DAILY PRN
COMMUNITY

## 2022-04-11 NOTE — PRE-PROCEDURE INSTRUCTIONS
Pt. Instructed on laxative and skin prep, pre-op meds, clear liquid diet. Ok to take all meds except Vitamin C, multivitamin, vitamin E, Diclofenac,Losartan, a.m. of procedure.

## 2022-04-12 ENCOUNTER — HOSPITAL ENCOUNTER (OUTPATIENT)
Facility: HOSPITAL | Age: 62
Setting detail: HOSPITAL OUTPATIENT SURGERY
Discharge: HOME | End: 2022-04-12
Attending: INTERNAL MEDICINE | Admitting: INTERNAL MEDICINE

## 2022-04-12 ENCOUNTER — ANESTHESIA EVENT (OUTPATIENT)
Dept: GASTROENTEROLOGY | Facility: HOSPITAL | Age: 62
End: 2022-04-12

## 2022-04-12 ENCOUNTER — ANESTHESIA (OUTPATIENT)
Dept: GASTROENTEROLOGY | Facility: HOSPITAL | Age: 62
End: 2022-04-12

## 2022-04-12 VITALS
WEIGHT: 216.93 LBS | SYSTOLIC BLOOD PRESSURE: 132 MMHG | HEART RATE: 90 BPM | OXYGEN SATURATION: 96 % | DIASTOLIC BLOOD PRESSURE: 81 MMHG | BODY MASS INDEX: 39.68 KG/M2 | RESPIRATION RATE: 17 BRPM | TEMPERATURE: 97.6 F

## 2022-04-12 DIAGNOSIS — R10.13 EPIGASTRIC PAIN: ICD-10-CM

## 2022-04-12 DIAGNOSIS — Z12.11 COLON CANCER SCREENING: ICD-10-CM

## 2022-04-12 DIAGNOSIS — R12 HEARTBURN: ICD-10-CM

## 2022-04-12 PROCEDURE — 88305 TISSUE EXAM BY PATHOLOGIST: CPT | Performed by: INTERNAL MEDICINE

## 2022-04-12 PROCEDURE — 45385 COLONOSCOPY W/LESION REMOVAL: CPT | Performed by: INTERNAL MEDICINE

## 2022-04-12 PROCEDURE — 43239 EGD BIOPSY SINGLE/MULTIPLE: CPT | Performed by: INTERNAL MEDICINE

## 2022-04-12 PROCEDURE — 25010000002 PROPOFOL 10 MG/ML EMULSION: Performed by: NURSE ANESTHETIST, CERTIFIED REGISTERED

## 2022-04-12 RX ORDER — LIDOCAINE HYDROCHLORIDE 20 MG/ML
INJECTION, SOLUTION INFILTRATION; PERINEURAL AS NEEDED
Status: DISCONTINUED | OUTPATIENT
Start: 2022-04-12 | End: 2022-04-12 | Stop reason: SURG

## 2022-04-12 RX ORDER — GLYCOPYRROLATE 0.2 MG/ML
INJECTION INTRAMUSCULAR; INTRAVENOUS AS NEEDED
Status: DISCONTINUED | OUTPATIENT
Start: 2022-04-12 | End: 2022-04-12 | Stop reason: SURG

## 2022-04-12 RX ORDER — PROPOFOL 10 MG/ML
VIAL (ML) INTRAVENOUS AS NEEDED
Status: DISCONTINUED | OUTPATIENT
Start: 2022-04-12 | End: 2022-04-12 | Stop reason: SURG

## 2022-04-12 RX ORDER — SODIUM CHLORIDE, SODIUM LACTATE, POTASSIUM CHLORIDE, CALCIUM CHLORIDE 600; 310; 30; 20 MG/100ML; MG/100ML; MG/100ML; MG/100ML
30 INJECTION, SOLUTION INTRAVENOUS CONTINUOUS
Status: DISCONTINUED | OUTPATIENT
Start: 2022-04-12 | End: 2022-04-12 | Stop reason: HOSPADM

## 2022-04-12 RX ADMIN — PROPOFOL 100 MG: 10 INJECTION, EMULSION INTRAVENOUS at 10:10

## 2022-04-12 RX ADMIN — PROPOFOL 200 MCG/KG/MIN: 10 INJECTION, EMULSION INTRAVENOUS at 10:10

## 2022-04-12 RX ADMIN — SODIUM CHLORIDE, POTASSIUM CHLORIDE, SODIUM LACTATE AND CALCIUM CHLORIDE 30 ML/HR: 600; 310; 30; 20 INJECTION, SOLUTION INTRAVENOUS at 09:35

## 2022-04-12 RX ADMIN — GLYCOPYRROLATE 0.2 MG: 0.2 INJECTION INTRAMUSCULAR; INTRAVENOUS at 10:17

## 2022-04-12 RX ADMIN — LIDOCAINE HYDROCHLORIDE 100 MG: 20 INJECTION, SOLUTION INFILTRATION; PERINEURAL at 10:10

## 2022-04-12 NOTE — ANESTHESIA PREPROCEDURE EVALUATION
Anesthesia Evaluation     Patient summary reviewed and Nursing notes reviewed                Airway   Mallampati: I  TM distance: >3 FB  Neck ROM: full  No difficulty expected  Dental    (+) lower dentures    Pulmonary - normal exam    breath sounds clear to auscultation  (+) shortness of breath,   Cardiovascular     Rhythm: regular  Rate: normal    (+) hypertension, valvular problems/murmurs AS, murmur,       Neuro/Psych  (+) psychiatric history,    GI/Hepatic/Renal/Endo    (+)  GERD,      Musculoskeletal     Abdominal    Substance History - negative use     OB/GYN negative ob/gyn ROS         Other   arthritis,                      Anesthesia Plan    ASA 3     general     intravenous induction     Anesthetic plan, all risks, benefits, and alternatives have been provided, discussed and informed consent has been obtained with: patient.        CODE STATUS:

## 2022-04-12 NOTE — H&P
Pre Procedure History & Physical    Chief Complaint:   Heartburn, epigastric pain, screening colonoscopy    Subjective     HPI:   60 yo F here for eval of heartburn, epigastric pain, screening colonoscopy.    Past Medical History:   Past Medical History:   Diagnosis Date   • Acid reflux    • Anesthesia complication     slept for 3 days with wisdom teeth at young age - no problems since   • Anxiety and depression    • Aortic valve stenosis    • Arthritis    • Chest pain    • Condition not found     ULCER    • Disc degeneration, lumbar    • Glaucoma    • Heart disease    • HTN (hypertension)    • Limb swelling    • Seasonal allergies    • Shortness of breath        Past Surgical History:  Past Surgical History:   Procedure Laterality Date   • CHOLECYSTECTOMY     • CYST REMOVAL      neck   • EYE SURGERY      EYE IMPLANT    • HYSTERECTOMY     • INJECTION OF MEDICATION Right     knee   • UPPER GASTROINTESTINAL ENDOSCOPY      Over ten years ago   • WISDOM TOOTH EXTRACTION         Family History:  Family History   Problem Relation Age of Onset   • Heart disease Mother    • Cancer Mother    • Arthritis Mother    • Stroke Father    • Heart disease Father    • Diabetes Father    • Arthritis Father    • Heart failure Other    • Other Other         SKIN CARCINOMA    • COPD Other    • Malig Hyperthermia Neg Hx        Social History:   reports that she has never smoked. She has never used smokeless tobacco. She reports that she does not drink alcohol and does not use drugs.    Medications:   Medications Prior to Admission   Medication Sig Dispense Refill Last Dose   • ascorbic acid (VITAMIN C) 1000 MG tablet Take 1,000 mg by mouth Every 7 (Seven) Days.      • buPROPion XL (WELLBUTRIN XL) 300 MG 24 hr tablet bupropion HCl 300 mg oral tablet extended release 24 hr take 1 tablet (300 mg) by oral route once daily   Active      • cetirizine (zyrTEC) 10 MG tablet Take 10 mg by mouth Daily.      • citalopram (CeleXA) 40 MG tablet Take  40 mg by mouth Daily.      • esomeprazole (nexIUM) 40 MG capsule Take 1 capsule by mouth Daily. 90 capsule 1    • fluticasone (FLONASE) 50 MCG/ACT nasal spray 2 sprays into the nostril(s) as directed by provider Daily As Needed.      • ketotifen (ZADITOR) 0.025 % ophthalmic solution Administer 2 drops to both eyes 2 (Two) Times a Day.      • latanoprost (XALATAN) 0.005 % ophthalmic solution INSTILL 1 DROP INTO EACH EYE NIGHTLY      • losartan-hydrochlorothiazide (HYZAAR) 50-12.5 MG per tablet Take 1 tablet by mouth Daily.      • metoprolol tartrate (LOPRESSOR) 25 MG tablet Take 1 tablet by mouth twice daily 180 tablet 3    • multivitamin with minerals tablet tablet Take 1 tablet by mouth Daily.      • vitamin E 100 UNIT capsule Take 100 Units by mouth Daily.      • brimonidine (ALPHAGAN P) 0.1 % solution ophthalmic solution       • diclofenac (VOLTAREN) 75 MG EC tablet Take 1 tablet by mouth Daily. (Patient taking differently: Take 75 mg by mouth Daily As Needed.) 60 tablet 1    • hydrOXYzine (ATARAX) 10 MG tablet Take 10 mg by mouth 3 (Three) Times a Day As Needed for Anxiety.      • travoprost, BAK free, (TRAVATAN) 0.004 % solution ophthalmic solution           Allergies:  Bactrim [sulfamethoxazole-trimethoprim], Molds & smuts, Travoprost, Naproxen, Trichophyton, Adhesive tape, and Penicillins    ROS:    Pertinent items are noted in HPI     Objective     Blood pressure 160/72, pulse 79, temperature 97.5 °F (36.4 °C), temperature source Temporal, resp. rate 20, weight 98.4 kg (216 lb 14.9 oz), SpO2 95 %.    Physical Exam   Constitutional: Pt is oriented to person, place, and time and well-developed, well-nourished, and in no distress.   Mouth/Throat: Oropharynx is clear and moist.   Neck: Normal range of motion.   Cardiovascular: Normal rate, regular rhythm and normal heart sounds.    Pulmonary/Chest: Effort normal and breath sounds normal.   Abdominal: Soft. Nontender  Skin: Skin is warm and dry.   Psychiatric:  Mood, memory, affect and judgment normal.     Assessment/Plan     Diagnosis:  Heartburn, epigastric pain, screening colonoscopy    Anticipated Surgical Procedure:  EGD/colonoscopy    The risks, benefits, and alternatives of this procedure have been discussed with the patient or the responsible party- the patient understands and agrees to proceed.

## 2022-04-12 NOTE — ANESTHESIA POSTPROCEDURE EVALUATION
Patient: Tiffani Stacy    Procedure Summary     Date: 04/12/22 Room / Location: [facility] ENDOSCOPY 1 / [facility] ENDOSCOPY    Anesthesia Start: 1010 Anesthesia Stop: 1048    Procedures:       ESOPHAGOGASTRODUODENOSCOPY WITH BX, POLYPECTOMY (N/A )      COLONOSCOPY WITH POLYPECTOMIES (N/A ) Diagnosis:       Heartburn      Epigastric pain      Colon cancer screening      (Heartburn [R12])      (Epigastric pain [R10.13])      (Colon cancer screening [Z12.11])    Surgeons: Betty Rossi MD Provider: Gael Mane MD    Anesthesia Type: general ASA Status: 3          Anesthesia Type: general    Vitals  Vitals Value Taken Time   /81 04/12/22 1101   Temp 36.4 °C (97.6 °F) 04/12/22 1101   Pulse 90 04/12/22 1101   Resp 17 04/12/22 1101   SpO2 96 % 04/12/22 1101           Post Anesthesia Care and Evaluation    Patient location during evaluation: bedside  Patient participation: complete - patient participated  Level of consciousness: awake  Pain management: adequate  Airway patency: patent  Anesthetic complications: No anesthetic complications  PONV Status: none  Cardiovascular status: acceptable  Respiratory status: acceptable  Hydration status: acceptable    Comments: An Anesthesiologist personally participated in the most demanding procedures (including induction and emergence if applicable) in the anesthesia plan, monitored the course of anesthesia administration at frequent intervals and remained physically present and available for immediate diagnosis and treatment of emergencies.

## 2022-04-13 LAB
CYTO UR: NORMAL
LAB AP CASE REPORT: NORMAL
LAB AP CLINICAL INFORMATION: NORMAL
PATH REPORT.FINAL DX SPEC: NORMAL
PATH REPORT.GROSS SPEC: NORMAL

## 2022-04-19 ENCOUNTER — TELEPHONE (OUTPATIENT)
Dept: GASTROENTEROLOGY | Facility: CLINIC | Age: 62
End: 2022-04-19

## 2022-04-19 NOTE — TELEPHONE ENCOUNTER
----- Message from SONYA Ruiz sent at 4/19/2022  3:07 PM EDT -----  Please place patient in colon recall for 5 years.  Stomach polyps benign.  Esophageal biopsies consistent with reflux esophagitis.  Please continue PPI and avoid NSAIDs.  Please make sure patient has follow-up scheduled.

## 2022-04-19 NOTE — TELEPHONE ENCOUNTER
Patient notified of results and follow up scheduled. Patient states that there was a mention of a hiatal hernia that may need surgery. Please advise.

## 2022-04-20 NOTE — TELEPHONE ENCOUNTER
Yes a hiatal hernia was found.  If patient is continuing to have symptoms we can discuss possible hiatal hernia repair referral at follow-up appointment.

## 2022-07-14 RX ORDER — ESOMEPRAZOLE MAGNESIUM 40 MG/1
CAPSULE, DELAYED RELEASE ORAL
Qty: 90 CAPSULE | Refills: 0 | Status: SHIPPED | OUTPATIENT
Start: 2022-07-14 | End: 2022-08-31 | Stop reason: SDUPTHER

## 2022-08-22 ENCOUNTER — TELEPHONE (OUTPATIENT)
Dept: CARDIOLOGY | Facility: CLINIC | Age: 62
End: 2022-08-22

## 2022-08-22 NOTE — TELEPHONE ENCOUNTER
Received VM from patient.     SW patient. Patient states she has been having worsening fatigue and SOB. Patient states it is worse than normal. Patient c/o dizziness. Patient states the dizziness occurs with walking. Patient BP WNL. Patient denies any weight gain and swelling.     Advised I would discuss with Dr Tellez and return call with recommendations.

## 2022-08-23 NOTE — TELEPHONE ENCOUNTER
I had already ordered an echo to be obtained a few weeks before her next visit in early 2023.  Given her worsening symptoms we should go ahead and obtain the echo now to assess for worsening LVOT obstruction.  I will defer any change in medical therapy until we obtain the results of the echo.  If she has severe symptoms in the meantime, she should go to the ED for evaluation.

## 2022-08-31 ENCOUNTER — TELEPHONE (OUTPATIENT)
Dept: CARDIOLOGY | Facility: CLINIC | Age: 62
End: 2022-08-31

## 2022-08-31 ENCOUNTER — OFFICE VISIT (OUTPATIENT)
Dept: GASTROENTEROLOGY | Facility: CLINIC | Age: 62
End: 2022-08-31

## 2022-08-31 VITALS
WEIGHT: 220.2 LBS | SYSTOLIC BLOOD PRESSURE: 121 MMHG | HEART RATE: 82 BPM | DIASTOLIC BLOOD PRESSURE: 43 MMHG | BODY MASS INDEX: 40.52 KG/M2 | HEIGHT: 62 IN

## 2022-08-31 DIAGNOSIS — K63.5 POLYP OF COLON, UNSPECIFIED PART OF COLON, UNSPECIFIED TYPE: ICD-10-CM

## 2022-08-31 DIAGNOSIS — E66.01 CLASS 3 SEVERE OBESITY WITH SERIOUS COMORBIDITY AND BODY MASS INDEX (BMI) OF 40.0 TO 44.9 IN ADULT, UNSPECIFIED OBESITY TYPE: ICD-10-CM

## 2022-08-31 DIAGNOSIS — K31.7 MULTIPLE GASTRIC POLYPS: ICD-10-CM

## 2022-08-31 DIAGNOSIS — K21.00 GASTROESOPHAGEAL REFLUX DISEASE WITH ESOPHAGITIS WITHOUT HEMORRHAGE: ICD-10-CM

## 2022-08-31 DIAGNOSIS — K44.9 HIATAL HERNIA: Primary | ICD-10-CM

## 2022-08-31 PROCEDURE — 99214 OFFICE O/P EST MOD 30 MIN: CPT | Performed by: NURSE PRACTITIONER

## 2022-08-31 RX ORDER — MONTELUKAST SODIUM 10 MG/1
10 TABLET ORAL DAILY
COMMUNITY
Start: 2022-08-10

## 2022-08-31 RX ORDER — ESOMEPRAZOLE MAGNESIUM 40 MG/1
40 CAPSULE, DELAYED RELEASE ORAL DAILY
Qty: 90 CAPSULE | Refills: 2 | Status: SHIPPED | OUTPATIENT
Start: 2022-08-31

## 2022-08-31 NOTE — PROGRESS NOTES
Chief Complaint  Follow-up (EGD and Colonoscopy ) and Losing Balance  (Getting dizzy and falling )    History of Present Illness  Tiffani Stacy is a 62 y.o. who presents to [facility] for follow up of Follow-up (EGD and Colonoscopy ) and Losing Balance  (Getting dizzy and falling ).    Ms. Stacy reports she has been following cardiology due to dizzy spells and falling. H/o aortic valve stenosis. Feels that hiatal hernia may be making things worse due to the pressure in epigastric region. Stress makes the pain worse. Stretching out and siting upright helps to decrease the pain. Heartburn controlled as long as she takes her nexium daily. Does feel she belch's more than she should. Admits to drinking 1-3 cokes daily. Does not drink through a straw. Denies any nausea, vomiting, or dysphagia. Appetite fluctuates, weight stable.     Labs Result Review Imaging    Past Medical History:   Diagnosis Date   • Acid reflux    • Anesthesia complication     slept for 3 days with wisdom teeth at young age - no problems since   • Anxiety and depression    • Aortic valve stenosis    • Arthritis    • Chest pain    • Condition not found     ULCER    • Disc degeneration, lumbar    • Glaucoma    • Heart disease    • HTN (hypertension)    • Limb swelling    • Seasonal allergies    • Shortness of breath        Past Surgical History:   Procedure Laterality Date   • CHOLECYSTECTOMY     • COLONOSCOPY N/A 4/12/2022    Procedure: COLONOSCOPY WITH POLYPECTOMIES;  Surgeon: Betty Rossi MD;  Location: [facility] ENDOSCOPY;  Service: Gastroenterology;  Laterality: N/A;  COLON POLYPS, HEMORRHOIDS   • CYST REMOVAL      neck   • ENDOSCOPY N/A 4/12/2022    Procedure: ESOPHAGOGASTRODUODENOSCOPY WITH BX, POLYPECTOMY;  Surgeon: Betty Rossi MD;  Location: [facility] ENDOSCOPY;  Service: Gastroenterology;  Laterality: N/A;  GASTRIC POLYPS, HIATAL HERNIA   • EYE SURGERY      EYE IMPLANT    •  HYSTERECTOMY     • INJECTION OF MEDICATION Right     knee   • UPPER GASTROINTESTINAL ENDOSCOPY      Over ten years ago   • WISDOM TOOTH EXTRACTION         Current Outpatient Medications on File Prior to Visit   Medication Sig Dispense Refill   • ascorbic acid (VITAMIN C) 1000 MG tablet Take 1,000 mg by mouth Every 7 (Seven) Days.     • buPROPion XL (WELLBUTRIN XL) 300 MG 24 hr tablet bupropion HCl 300 mg oral tablet extended release 24 hr take 1 tablet (300 mg) by oral route once daily   Active     • cetirizine (zyrTEC) 10 MG tablet Take 10 mg by mouth Daily.     • citalopram (CeleXA) 40 MG tablet Take 40 mg by mouth Daily.     • diclofenac (VOLTAREN) 75 MG EC tablet Take 1 tablet by mouth Daily. (Patient taking differently: Take 75 mg by mouth Daily As Needed.) 60 tablet 1   • fluticasone (FLONASE) 50 MCG/ACT nasal spray 2 sprays into the nostril(s) as directed by provider Daily As Needed.     • hydrOXYzine (ATARAX) 10 MG tablet Take 10 mg by mouth 3 (Three) Times a Day As Needed for Anxiety.     • ketotifen (ZADITOR) 0.025 % ophthalmic solution Administer 2 drops to both eyes 2 (Two) Times a Day.     • latanoprost (XALATAN) 0.005 % ophthalmic solution INSTILL 1 DROP INTO EACH EYE NIGHTLY     • losartan-hydrochlorothiazide (HYZAAR) 50-12.5 MG per tablet Take 1 tablet by mouth Daily.     • metoprolol tartrate (LOPRESSOR) 25 MG tablet Take 1 tablet by mouth twice daily 180 tablet 3   • montelukast (SINGULAIR) 10 MG tablet Take 10 mg by mouth Daily.     • multivitamin with minerals tablet tablet Take 1 tablet by mouth Daily.     • vitamin E 100 UNIT capsule Take 100 Units by mouth Daily.     • [DISCONTINUED] esomeprazole (nexIUM) 40 MG capsule Take 1 capsule by mouth once daily 90 capsule 0   • brimonidine (ALPHAGAN P) 0.1 % solution ophthalmic solution      • travoprost, BAK free, (TRAVATAN) 0.004 % solution ophthalmic solution        No current facility-administered medications on file prior to visit.       Social  "History     Social History Narrative    CLAUSTROPHOBIC - YES     EXPOSED TO SECOND HAND SMOKE - 10/3/16          Objective     Review of Systems   Constitutional: Negative for appetite change and unexpected weight loss.   Gastrointestinal: Positive for abdominal pain and GERD.        Vital Signs:   /43 (BP Location: Left arm, Patient Position: Sitting, Cuff Size: Small Adult)   Pulse 82   Ht 157.5 cm (62\")   Wt 99.9 kg (220 lb 3.2 oz)   BMI 40.28 kg/m²       Physical Exam  Constitutional:       General: She is not in acute distress.     Appearance: Normal appearance. She is well-developed. She is obese.   HENT:      Head: Normocephalic and atraumatic.   Eyes:      Conjunctiva/sclera: Conjunctivae normal.      Pupils: Pupils are equal, round, and reactive to light.      Visual Fields: Right eye visual fields normal and left eye visual fields normal.   Cardiovascular:      Rate and Rhythm: Normal rate and regular rhythm.      Heart sounds: Normal heart sounds.   Pulmonary:      Effort: Pulmonary effort is normal. No retractions.      Breath sounds: Normal breath sounds and air entry.   Abdominal:      General: Bowel sounds are normal. There is no distension.      Palpations: Abdomen is soft.      Tenderness: There is abdominal tenderness in the epigastric area.      Comments: No appreciable hepatosplenomegaly or ascites   Musculoskeletal:         General: Normal range of motion.      Cervical back: Normal range of motion and neck supple.   Skin:     General: Skin is warm and dry.   Neurological:      Mental Status: She is alert and oriented to person, place, and time.   Psychiatric:         Mood and Affect: Mood and affect normal.         Behavior: Behavior normal.         Result Review :   The following data was reviewed by: SONYA Ruiz on 08/31/2022:      No results found for: LIPASE, AMYLASE, IRON, TIBC, LABIRON, TRANSFERRIN, FERRITIN, SEDRATE, CRP, AFPTM, DSDNA, EXPANDEDENA, SMOOTHMUSCAB, " CERULOPLSM, LABIMMURE, TOTIGGREF, IGA, IGM, MITOAB, HEPBSAG, HEPAIGM, HEPBIGMCORE, HEPCVIRUSABY, PROTIME, INR, AMMONIA     EGD 4/12/2022: Hiatal hernia 33 cm from incisors narrowing at 36 cm.  Irregular Z-line at GE junction.  Multiple gastric polyps.  Colonoscopy 4/12/2022: Two 3 to 4 mm polyps in sigmoid colon.  Hemorrhoids on perianal exam.  Sigmoid colon biopsy-fragments of tubular adenoma and hyperplastic polyp.  Antrum biopsy-no significant pathologic change.  Stomach polyp biopsy-fundic gland polyp.  Gastroesophageal junction biopsy-squamocolumnar mucosa with changes consistent reflux esophagitis.      Assessment and Plan    Diagnoses and all orders for this visit:    1. Hiatal hernia (Primary)  -     Ambulatory Referral to Nutrition Services    2. Multiple gastric polyps    3. Polyp of colon, unspecified part of colon, unspecified type    4. Gastroesophageal reflux disease with esophagitis without hemorrhage    5. Class 3 severe obesity with serious comorbidity and body mass index (BMI) of 40.0 to 44.9 in adult, unspecified obesity type (HCC)  -     Ambulatory Referral to Nutrition Services    Other orders  -     esomeprazole (nexIUM) 40 MG capsule; Take 1 capsule by mouth Daily.  Dispense: 90 capsule; Refill: 2      * Surgery not found *     Plan is to decrease pressure on hiatal hernia with weight loss.  Patient has questions about foods that contain carbohydrates and would like a referral to dietitian for basic knowledge on calorie restrictive diet to increase weight loss results.    Educated patient to decrease and try to stop Coke consumption as it was increased belching as well as symptoms of GERD.    Follow Up   Return in about 6 months (around 2/28/2023).  Patient was given instructions and counseling regarding her condition or for health maintenance advice. Please see specific information pulled into the AVS if appropriate.

## 2022-08-31 NOTE — TELEPHONE ENCOUNTER
----- Message from Kalee Abad MA sent at 8/31/2022 10:48 AM EDT -----    ----- Message -----  From: Josué Tellez MD  Sent: 8/28/2022   3:35 PM EDT  To: Zoe Wilde RN    Ms. Stacy's echo showed stable findings from her previous study.  I would not recommend any medication changes based on the results of her echo.  If she is still having dizziness, we may want to decrease her BP medications and see if that helps.

## 2022-10-18 ENCOUNTER — OFFICE VISIT (OUTPATIENT)
Dept: ORTHOPEDIC SURGERY | Facility: CLINIC | Age: 62
End: 2022-10-18

## 2022-10-18 ENCOUNTER — PREP FOR SURGERY (OUTPATIENT)
Dept: OTHER | Facility: HOSPITAL | Age: 62
End: 2022-10-18

## 2022-10-18 VITALS — BODY MASS INDEX: 40.48 KG/M2 | OXYGEN SATURATION: 97 % | HEART RATE: 76 BPM | WEIGHT: 220 LBS | HEIGHT: 62 IN

## 2022-10-18 DIAGNOSIS — M17.11 PRIMARY OSTEOARTHRITIS OF RIGHT KNEE: ICD-10-CM

## 2022-10-18 DIAGNOSIS — M17.11 PRIMARY LOCALIZED OSTEOARTHROSIS OF RIGHT LOWER LEG: Primary | ICD-10-CM

## 2022-10-18 DIAGNOSIS — M65.311 TRIGGER THUMB, RIGHT THUMB: ICD-10-CM

## 2022-10-18 PROBLEM — M17.9 OA (OSTEOARTHRITIS) OF KNEE: Status: ACTIVE | Noted: 2022-10-18

## 2022-10-18 PROCEDURE — 99213 OFFICE O/P EST LOW 20 MIN: CPT | Performed by: ORTHOPAEDIC SURGERY

## 2022-10-18 PROCEDURE — 20550 NJX 1 TENDON SHEATH/LIGAMENT: CPT | Performed by: ORTHOPAEDIC SURGERY

## 2022-10-18 RX ORDER — TRANEXAMIC ACID 10 MG/ML
1000 INJECTION, SOLUTION INTRAVENOUS ONCE
Status: CANCELLED | OUTPATIENT
Start: 2022-10-18 | End: 2022-10-18

## 2022-10-18 RX ORDER — LIDOCAINE HYDROCHLORIDE 10 MG/ML
1 INJECTION, SOLUTION INFILTRATION; PERINEURAL
Status: COMPLETED | OUTPATIENT
Start: 2022-10-18 | End: 2022-10-18

## 2022-10-18 RX ORDER — POVIDONE-IODINE 10 MG/ML
SOLUTION TOPICAL ONCE
Status: CANCELLED | OUTPATIENT
Start: 2022-10-18 | End: 2022-10-18

## 2022-10-18 RX ORDER — CEFAZOLIN SODIUM 2 G/100ML
2 INJECTION, SOLUTION INTRAVENOUS ONCE
Status: CANCELLED | OUTPATIENT
Start: 2022-10-18 | End: 2022-10-18

## 2022-10-18 RX ORDER — CEFAZOLIN SODIUM IN 0.9 % NACL 3 G/100 ML
3 INTRAVENOUS SOLUTION, PIGGYBACK (ML) INTRAVENOUS ONCE
Status: CANCELLED | OUTPATIENT
Start: 2022-10-18 | End: 2022-10-18

## 2022-10-18 RX ORDER — TRIAMCINOLONE ACETONIDE 40 MG/ML
40 INJECTION, SUSPENSION INTRA-ARTICULAR; INTRAMUSCULAR
Status: COMPLETED | OUTPATIENT
Start: 2022-10-18 | End: 2022-10-18

## 2022-10-18 RX ADMIN — LIDOCAINE HYDROCHLORIDE 1 ML: 10 INJECTION, SOLUTION INFILTRATION; PERINEURAL at 14:24

## 2022-10-18 RX ADMIN — TRIAMCINOLONE ACETONIDE 40 MG: 40 INJECTION, SUSPENSION INTRA-ARTICULAR; INTRAMUSCULAR at 14:24

## 2022-10-18 NOTE — PROGRESS NOTES
"Chief Complaint  Follow-up of the Right Knee     Subjective      Tiffani Stacy presents to [facility] for right knee.  In the past, I stated once she couldn't stand the pain in the knee anymore to follow-up back up with me. Pain is no longer tolerable, she presents herself today. The shot lasted about three months.  Patient wears right knee brace.  Patient reported thumb sticks at times and she has to pull it back to open it up.       Allergies   Allergen Reactions   • Bactrim [Sulfamethoxazole-Trimethoprim] Swelling   • Molds & Smuts Anaphylaxis   • Travoprost Swelling   • Naproxen Swelling   • Trichophyton Itching   • Adhesive Tape Rash   • Penicillins Rash        Social History     Socioeconomic History   • Marital status: Single   Tobacco Use   • Smoking status: Never   • Smokeless tobacco: Never   Vaping Use   • Vaping Use: Never used   Substance and Sexual Activity   • Alcohol use: Never   • Drug use: Never   • Sexual activity: Defer        Review of Systems     Objective   Vital Signs:   Pulse 76   Ht 157.5 cm (62\")   Wt 99.8 kg (220 lb)   SpO2 97%   BMI 40.24 kg/m²       Physical Exam  Constitutional:       Appearance: Normal appearance. Patient is well-developed and normal weight.   HENT:      Head: Normocephalic.      Right Ear: Hearing and external ear normal.      Left Ear: Hearing and external ear normal.      Nose: Nose normal.   Eyes:      Conjunctiva/sclera: Conjunctivae normal.   Cardiovascular:      Rate and Rhythm: Normal rate.   Pulmonary:      Effort: Pulmonary effort is normal.      Breath sounds: No wheezing or rales.   Abdominal:      Palpations: Abdomen is soft.      Tenderness: There is no abdominal tenderness.   Musculoskeletal:      Cervical back: Normal range of motion.   Skin:     Findings: No rash.   Neurological:      Mental Status: Patient is alert and oriented to person, place, and time.   Psychiatric:         Mood and Affect: Mood and affect " normal.         Judgment: Judgment normal.       Ortho Exam      RIGHT KNEE Dorsal pedal pulse 2+, posterior tibialis pulse 2+. Good strength to hamstrings, quadriceps, dorsiflexors, and plantar flexors. Good tone of hip flexors, hip extensors, and hip abductors. Minimal swelling to right knee.     RIGHT THUMB Lock and catching of RT thumb.  CMC grind test of Right thumb.  Radial pulse 2+. Ulnar pulse 2+. Sensation intact. Neurovascular Intact. Full wrist extension and flexion.  Full . Full thumb opposition.     Small Joint Arthrocentesis  Consent given by: patient  Site marked: site marked  Timeout: Immediately prior to procedure a time out was called to verify the correct patient, procedure, equipment, support staff and site/side marked as required   Supporting Documentation  Indications: pain   Procedure Details  Location: right trigger thumb.  Preparation: Patient was prepped and draped in the usual sterile fashion  Needle gauge: 23G.  Medications administered: 1 mL lidocaine 1 %; 40 mg triamcinolone acetonide 40 MG/ML  Patient tolerance: patient tolerated the procedure well with no immediate complications            Imaging Results (Most Recent)     Procedure Component Value Units Date/Time    XR Knee 3 View Right [[id number]] Resulted: 10/18/22 1404     Updated: 10/18/22 1406           Result Review :     X-Ray Report:  Right knee(s) X-Ray  Indication: Evaluation of right knee  AP, Lateral and Standing view(s)  Findings: Severe arthritis. Bone on bone.  No fracture.   Prior studies available for comparison: yes           Assessment and Plan     Diagnoses and all orders for this visit:    1. Primary localized osteoarthrosis of right lower leg (Primary)  -     XR Knee 3 View Right    2. Primary osteoarthritis of right knee    3. Trigger thumb, right thumb        Conservative measures vs surgery.  Patient educated with the risks and benefits of surgery.  Discussed trigger thumb and decided to give injection.  Patient tolerated well.     Patient wishes to proceed with a right total knee replacement.     Discussed surgery., Risks/benefits discussed with patient including, but not limited to: infection, bleeding, neurovascular damage, re-rupture, aesthetic deformity, need for further surgery, and death. and Surgery pamphlet given.    Follow Up     Post-operatively      Patient was given instructions and counseling regarding her condition or for health maintenance advice. Please see specific information pulled into the AVS if appropriate.     Scribed for Natalie Marques MD by Adriana Raphael MA.  10/18/22   14:10 EDT    I have personally performed the services described in this document as scribed by the above individual and it is both accurate and complete. Natalie Marques MD 10/18/22

## 2022-10-19 ENCOUNTER — TELEPHONE (OUTPATIENT)
Dept: CARDIOLOGY | Facility: CLINIC | Age: 62
End: 2022-10-19

## 2022-10-19 NOTE — TELEPHONE ENCOUNTER
Procedure: Right Total Knee replacement    Medication Directive: NA    PMH: Aortic Valve Stenosis    Last Seen: 02/09/2022      ECHO: 08/24/2022    Normal left ventricular systolic function with an estimated ejection fraction of 60-65%.  No regional wall motion abnormalities were observed.  Left ventricular diastolic function is consistent with impaired relaxation with elevated left ventricular filling pressure (grade Ia diastolic dysfunction with high LAP).   Mild concentric left ventricular hypertrophy, slightly more pronounced in the basal septum.  However, no significant LVOT gradient was identified on this study.  Moderately dilated left atrium.  Mild posterior mitral annular calcification with mild mitral regurgitation.  Probably trileaflet aortic valve with moderate sclerosis/calcification.  Mild aortic stenosis with peak and mean gradients across the valve of 25 and 14 mmHg, respectively.  Calculated dimensionless index = 0.62.  Mild aortic regurgitation.  Mild tricuspid regurgitation.  Estimated right ventricular systolic pressure was within normal limits.

## 2022-10-21 DIAGNOSIS — Z01.818 ENCOUNTER FOR PREADMISSION TESTING: Primary | ICD-10-CM

## 2022-10-25 ENCOUNTER — PRE-ADMISSION TESTING (OUTPATIENT)
Dept: PREADMISSION TESTING | Facility: HOSPITAL | Age: 62
End: 2022-10-25

## 2022-10-25 VITALS
HEIGHT: 62 IN | BODY MASS INDEX: 39.35 KG/M2 | OXYGEN SATURATION: 96 % | HEART RATE: 56 BPM | RESPIRATION RATE: 16 BRPM | TEMPERATURE: 97.9 F | WEIGHT: 213.85 LBS

## 2022-10-25 DIAGNOSIS — Z47.1 AFTERCARE FOLLOWING RIGHT KNEE JOINT REPLACEMENT SURGERY: Primary | ICD-10-CM

## 2022-10-25 DIAGNOSIS — Z96.651 AFTERCARE FOLLOWING RIGHT KNEE JOINT REPLACEMENT SURGERY: Primary | ICD-10-CM

## 2022-10-25 LAB
ALBUMIN SERPL-MCNC: 4.1 G/DL (ref 3.5–5.2)
ALBUMIN/GLOB SERPL: 1.5 G/DL
ALP SERPL-CCNC: 63 U/L (ref 39–117)
ALT SERPL W P-5'-P-CCNC: 11 U/L (ref 1–33)
ANION GAP SERPL CALCULATED.3IONS-SCNC: 10.6 MMOL/L (ref 5–15)
AST SERPL-CCNC: 18 U/L (ref 1–32)
BASOPHILS # BLD AUTO: 0.03 10*3/MM3 (ref 0–0.2)
BASOPHILS NFR BLD AUTO: 0.5 % (ref 0–1.5)
BILIRUB SERPL-MCNC: 0.3 MG/DL (ref 0–1.2)
BUN SERPL-MCNC: 11 MG/DL (ref 8–23)
BUN/CREAT SERPL: 13.4 (ref 7–25)
CALCIUM SPEC-SCNC: 8.9 MG/DL (ref 8.6–10.5)
CHLORIDE SERPL-SCNC: 102 MMOL/L (ref 98–107)
CO2 SERPL-SCNC: 25.4 MMOL/L (ref 22–29)
CREAT SERPL-MCNC: 0.82 MG/DL (ref 0.57–1)
DEPRECATED RDW RBC AUTO: 39.4 FL (ref 37–54)
EGFRCR SERPLBLD CKD-EPI 2021: 80.5 ML/MIN/1.73
EOSINOPHIL # BLD AUTO: 0.18 10*3/MM3 (ref 0–0.4)
EOSINOPHIL NFR BLD AUTO: 2.7 % (ref 0.3–6.2)
ERYTHROCYTE [DISTWIDTH] IN BLOOD BY AUTOMATED COUNT: 12.6 % (ref 12.3–15.4)
GLOBULIN UR ELPH-MCNC: 2.8 GM/DL
GLUCOSE SERPL-MCNC: 132 MG/DL (ref 65–99)
HBA1C MFR BLD: 6.4 % (ref 4.8–5.6)
HCT VFR BLD AUTO: 37.4 % (ref 34–46.6)
HGB BLD-MCNC: 12.8 G/DL (ref 12–15.9)
IMM GRANULOCYTES # BLD AUTO: 0.02 10*3/MM3 (ref 0–0.05)
IMM GRANULOCYTES NFR BLD AUTO: 0.3 % (ref 0–0.5)
INR PPP: 1.04 (ref 0.86–1.15)
LYMPHOCYTES # BLD AUTO: 2.4 10*3/MM3 (ref 0.7–3.1)
LYMPHOCYTES NFR BLD AUTO: 36.3 % (ref 19.6–45.3)
MCH RBC QN AUTO: 29.5 PG (ref 26.6–33)
MCHC RBC AUTO-ENTMCNC: 34.2 G/DL (ref 31.5–35.7)
MCV RBC AUTO: 86.2 FL (ref 79–97)
MONOCYTES # BLD AUTO: 0.8 10*3/MM3 (ref 0.1–0.9)
MONOCYTES NFR BLD AUTO: 12.1 % (ref 5–12)
NEUTROPHILS NFR BLD AUTO: 3.19 10*3/MM3 (ref 1.7–7)
NEUTROPHILS NFR BLD AUTO: 48.1 % (ref 42.7–76)
NRBC BLD AUTO-RTO: 0 /100 WBC (ref 0–0.2)
PLATELET # BLD AUTO: 226 10*3/MM3 (ref 140–450)
PMV BLD AUTO: 11.7 FL (ref 6–12)
POTASSIUM SERPL-SCNC: 3.9 MMOL/L (ref 3.5–5.2)
PROT SERPL-MCNC: 6.9 G/DL (ref 6–8.5)
PROTHROMBIN TIME: 13.7 SECONDS (ref 11.8–14.9)
RBC # BLD AUTO: 4.34 10*6/MM3 (ref 3.77–5.28)
SODIUM SERPL-SCNC: 138 MMOL/L (ref 136–145)
WBC NRBC COR # BLD: 6.62 10*3/MM3 (ref 3.4–10.8)

## 2022-10-25 PROCEDURE — 83036 HEMOGLOBIN GLYCOSYLATED A1C: CPT | Performed by: ORTHOPAEDIC SURGERY

## 2022-10-25 PROCEDURE — 93005 ELECTROCARDIOGRAM TRACING: CPT | Performed by: ORTHOPAEDIC SURGERY

## 2022-10-25 PROCEDURE — 80053 COMPREHEN METABOLIC PANEL: CPT | Performed by: ORTHOPAEDIC SURGERY

## 2022-10-25 PROCEDURE — 85025 COMPLETE CBC W/AUTO DIFF WBC: CPT | Performed by: ORTHOPAEDIC SURGERY

## 2022-10-25 PROCEDURE — 85610 PROTHROMBIN TIME: CPT | Performed by: ORTHOPAEDIC SURGERY

## 2022-10-26 ENCOUNTER — ANESTHESIA EVENT (OUTPATIENT)
Dept: PERIOP | Facility: HOSPITAL | Age: 62
End: 2022-10-26

## 2022-11-03 DIAGNOSIS — Z47.1 AFTERCARE FOLLOWING RIGHT KNEE JOINT REPLACEMENT SURGERY: Primary | ICD-10-CM

## 2022-11-03 DIAGNOSIS — Z96.651 AFTERCARE FOLLOWING RIGHT KNEE JOINT REPLACEMENT SURGERY: Primary | ICD-10-CM

## 2022-11-04 ENCOUNTER — ANESTHESIA (OUTPATIENT)
Dept: PERIOP | Facility: HOSPITAL | Age: 62
End: 2022-11-04

## 2022-11-04 ENCOUNTER — APPOINTMENT (OUTPATIENT)
Dept: GENERAL RADIOLOGY | Facility: HOSPITAL | Age: 62
End: 2022-11-04

## 2022-11-04 ENCOUNTER — HOSPITAL ENCOUNTER (INPATIENT)
Facility: HOSPITAL | Age: 62
LOS: 1 days | Discharge: HOME HEALTH | End: 2022-11-06
Attending: ORTHOPAEDIC SURGERY | Admitting: FAMILY MEDICINE

## 2022-11-04 DIAGNOSIS — M17.11 PRIMARY LOCALIZED OSTEOARTHROSIS OF RIGHT LOWER LEG: ICD-10-CM

## 2022-11-04 DIAGNOSIS — R26.2 DIFFICULTY IN WALKING: ICD-10-CM

## 2022-11-04 DIAGNOSIS — Z78.9 DECREASED ACTIVITIES OF DAILY LIVING (ADL): ICD-10-CM

## 2022-11-04 DIAGNOSIS — Z01.818 ENCOUNTER FOR PREADMISSION TESTING: ICD-10-CM

## 2022-11-04 DIAGNOSIS — M17.11 PRIMARY OSTEOARTHRITIS OF RIGHT KNEE: Primary | ICD-10-CM

## 2022-11-04 PROCEDURE — 25010000002 PROPOFOL 10 MG/ML EMULSION: Performed by: NURSE ANESTHETIST, CERTIFIED REGISTERED

## 2022-11-04 PROCEDURE — 25010000002 KETOROLAC TROMETHAMINE PER 15 MG: Performed by: ORTHOPAEDIC SURGERY

## 2022-11-04 PROCEDURE — 25010000002 CEFAZOLIN IN DEXTROSE 2-4 GM/100ML-% SOLUTION: Performed by: ORTHOPAEDIC SURGERY

## 2022-11-04 PROCEDURE — 25010000002 DEXAMETHASONE PER 1 MG: Performed by: NURSE ANESTHETIST, CERTIFIED REGISTERED

## 2022-11-04 PROCEDURE — 94761 N-INVAS EAR/PLS OXIMETRY MLT: CPT

## 2022-11-04 PROCEDURE — 25010000002 ROPIVACAINE PER 1 MG: Performed by: ORTHOPAEDIC SURGERY

## 2022-11-04 PROCEDURE — 27447 TOTAL KNEE ARTHROPLASTY: CPT | Performed by: ORTHOPAEDIC SURGERY

## 2022-11-04 PROCEDURE — 73560 X-RAY EXAM OF KNEE 1 OR 2: CPT

## 2022-11-04 PROCEDURE — 76942 ECHO GUIDE FOR BIOPSY: CPT | Performed by: ORTHOPAEDIC SURGERY

## 2022-11-04 PROCEDURE — 25010000002 MORPHINE SULFATE (PF) 10 MG/ML SOLUTION: Performed by: ORTHOPAEDIC SURGERY

## 2022-11-04 PROCEDURE — C1713 ANCHOR/SCREW BN/BN,TIS/BN: HCPCS | Performed by: ORTHOPAEDIC SURGERY

## 2022-11-04 PROCEDURE — 0 MEPERIDINE PER 100 MG: Performed by: NURSE ANESTHETIST, CERTIFIED REGISTERED

## 2022-11-04 PROCEDURE — 25010000002 EPINEPHRINE 1 MG/ML SOLUTION: Performed by: ORTHOPAEDIC SURGERY

## 2022-11-04 PROCEDURE — 25010000002 FENTANYL CITRATE (PF) 50 MCG/ML SOLUTION: Performed by: NURSE ANESTHETIST, CERTIFIED REGISTERED

## 2022-11-04 PROCEDURE — 0SRC0J9 REPLACEMENT OF RIGHT KNEE JOINT WITH SYNTHETIC SUBSTITUTE, CEMENTED, OPEN APPROACH: ICD-10-PCS | Performed by: ORTHOPAEDIC SURGERY

## 2022-11-04 PROCEDURE — 25010000002 ONDANSETRON PER 1 MG: Performed by: NURSE ANESTHETIST, CERTIFIED REGISTERED

## 2022-11-04 PROCEDURE — 97161 PT EVAL LOW COMPLEX 20 MIN: CPT

## 2022-11-04 PROCEDURE — 99221 1ST HOSP IP/OBS SF/LOW 40: CPT | Performed by: PHYSICIAN ASSISTANT

## 2022-11-04 PROCEDURE — C1776 JOINT DEVICE (IMPLANTABLE): HCPCS | Performed by: ORTHOPAEDIC SURGERY

## 2022-11-04 PROCEDURE — 25010000002 MIDAZOLAM PER 1 MG: Performed by: ANESTHESIOLOGY

## 2022-11-04 PROCEDURE — 94799 UNLISTED PULMONARY SVC/PX: CPT

## 2022-11-04 DEVICE — COMP FEM/KN VANGUARD INTLK CR 62.5MM NS RT: Type: IMPLANTABLE DEVICE | Site: KNEE | Status: FUNCTIONAL

## 2022-11-04 DEVICE — CMT BONE PALACOS R HI/VISC 1X40: Type: IMPLANTABLE DEVICE | Site: KNEE | Status: FUNCTIONAL

## 2022-11-04 DEVICE — CAP TOTL KN CMT PRIMARY: Type: IMPLANTABLE DEVICE | Site: KNEE | Status: FUNCTIONAL

## 2022-11-04 DEVICE — PAT 3PEG STD 8X28MM: Type: IMPLANTABLE DEVICE | Site: KNEE | Status: FUNCTIONAL

## 2022-11-04 DEVICE — TRY TIB CRUC 71MM: Type: IMPLANTABLE DEVICE | Site: KNEE | Status: FUNCTIONAL

## 2022-11-04 DEVICE — BEAR TIB/KN VANGUARD AS 10X71MM NS: Type: IMPLANTABLE DEVICE | Site: KNEE | Status: FUNCTIONAL

## 2022-11-04 RX ORDER — GLYCOPYRROLATE 0.2 MG/ML
0.2 INJECTION INTRAMUSCULAR; INTRAVENOUS
Status: COMPLETED | OUTPATIENT
Start: 2022-11-04 | End: 2022-11-04

## 2022-11-04 RX ORDER — POVIDONE-IODINE 10 MG/ML
SOLUTION TOPICAL ONCE
Status: COMPLETED | OUTPATIENT
Start: 2022-11-04 | End: 2022-11-04

## 2022-11-04 RX ORDER — NALOXONE HCL 0.4 MG/ML
0.4 VIAL (ML) INJECTION
Status: DISCONTINUED | OUTPATIENT
Start: 2022-11-04 | End: 2022-11-06 | Stop reason: HOSPADM

## 2022-11-04 RX ORDER — SODIUM CHLORIDE, SODIUM LACTATE, POTASSIUM CHLORIDE, CALCIUM CHLORIDE 600; 310; 30; 20 MG/100ML; MG/100ML; MG/100ML; MG/100ML
100 INJECTION, SOLUTION INTRAVENOUS CONTINUOUS
Status: DISCONTINUED | OUTPATIENT
Start: 2022-11-04 | End: 2022-11-06 | Stop reason: HOSPADM

## 2022-11-04 RX ORDER — OXYCODONE HYDROCHLORIDE 5 MG/1
5 TABLET ORAL
Status: DISCONTINUED | OUTPATIENT
Start: 2022-11-04 | End: 2022-11-04 | Stop reason: HOSPADM

## 2022-11-04 RX ORDER — TRANEXAMIC ACID 10 MG/ML
1000 INJECTION, SOLUTION INTRAVENOUS ONCE
Status: COMPLETED | OUTPATIENT
Start: 2022-11-04 | End: 2022-11-04

## 2022-11-04 RX ORDER — ROCURONIUM BROMIDE 10 MG/ML
INJECTION, SOLUTION INTRAVENOUS AS NEEDED
Status: DISCONTINUED | OUTPATIENT
Start: 2022-11-04 | End: 2022-11-04 | Stop reason: SURG

## 2022-11-04 RX ORDER — PANTOPRAZOLE SODIUM 40 MG/1
40 TABLET, DELAYED RELEASE ORAL EVERY MORNING
Status: DISCONTINUED | OUTPATIENT
Start: 2022-11-05 | End: 2022-11-06 | Stop reason: HOSPADM

## 2022-11-04 RX ORDER — ONDANSETRON 2 MG/ML
4 INJECTION INTRAMUSCULAR; INTRAVENOUS ONCE AS NEEDED
Status: DISCONTINUED | OUTPATIENT
Start: 2022-11-04 | End: 2022-11-04 | Stop reason: HOSPADM

## 2022-11-04 RX ORDER — KETOROLAC TROMETHAMINE 15 MG/ML
15 INJECTION, SOLUTION INTRAMUSCULAR; INTRAVENOUS EVERY 6 HOURS SCHEDULED
Status: COMPLETED | OUTPATIENT
Start: 2022-11-04 | End: 2022-11-05

## 2022-11-04 RX ORDER — LIDOCAINE HYDROCHLORIDE 20 MG/ML
INJECTION, SOLUTION EPIDURAL; INFILTRATION; INTRACAUDAL; PERINEURAL AS NEEDED
Status: DISCONTINUED | OUTPATIENT
Start: 2022-11-04 | End: 2022-11-04 | Stop reason: SURG

## 2022-11-04 RX ORDER — MEPERIDINE HYDROCHLORIDE 25 MG/ML
12.5 INJECTION INTRAMUSCULAR; INTRAVENOUS; SUBCUTANEOUS
Status: DISCONTINUED | OUTPATIENT
Start: 2022-11-04 | End: 2022-11-04 | Stop reason: HOSPADM

## 2022-11-04 RX ORDER — ACETAMINOPHEN 500 MG
1000 TABLET ORAL ONCE
Status: COMPLETED | OUTPATIENT
Start: 2022-11-04 | End: 2022-11-04

## 2022-11-04 RX ORDER — CETIRIZINE HYDROCHLORIDE 10 MG/1
10 TABLET ORAL NIGHTLY
Status: DISCONTINUED | OUTPATIENT
Start: 2022-11-04 | End: 2022-11-06 | Stop reason: HOSPADM

## 2022-11-04 RX ORDER — MIDAZOLAM HYDROCHLORIDE 1 MG/ML
2 INJECTION INTRAMUSCULAR; INTRAVENOUS ONCE
Status: COMPLETED | OUTPATIENT
Start: 2022-11-04 | End: 2022-11-04

## 2022-11-04 RX ORDER — CEFAZOLIN SODIUM 2 G/100ML
2 INJECTION, SOLUTION INTRAVENOUS ONCE
Status: COMPLETED | OUTPATIENT
Start: 2022-11-04 | End: 2022-11-04

## 2022-11-04 RX ORDER — EPHEDRINE SULFATE 50 MG/ML
INJECTION, SOLUTION INTRAVENOUS AS NEEDED
Status: DISCONTINUED | OUTPATIENT
Start: 2022-11-04 | End: 2022-11-04 | Stop reason: SURG

## 2022-11-04 RX ORDER — SODIUM CHLORIDE, SODIUM LACTATE, POTASSIUM CHLORIDE, CALCIUM CHLORIDE 600; 310; 30; 20 MG/100ML; MG/100ML; MG/100ML; MG/100ML
9 INJECTION, SOLUTION INTRAVENOUS CONTINUOUS PRN
Status: DISCONTINUED | OUTPATIENT
Start: 2022-11-04 | End: 2022-11-06 | Stop reason: HOSPADM

## 2022-11-04 RX ORDER — HYDROXYZINE HYDROCHLORIDE 10 MG/1
30 TABLET, FILM COATED ORAL NIGHTLY PRN
Status: DISCONTINUED | OUTPATIENT
Start: 2022-11-04 | End: 2022-11-05

## 2022-11-04 RX ORDER — CALCIUM CARBONATE 200(500)MG
2 TABLET,CHEWABLE ORAL 3 TIMES DAILY PRN
Status: DISCONTINUED | OUTPATIENT
Start: 2022-11-04 | End: 2022-11-06 | Stop reason: HOSPADM

## 2022-11-04 RX ORDER — ONDANSETRON 2 MG/ML
4 INJECTION INTRAMUSCULAR; INTRAVENOUS EVERY 6 HOURS PRN
Status: DISCONTINUED | OUTPATIENT
Start: 2022-11-04 | End: 2022-11-06 | Stop reason: HOSPADM

## 2022-11-04 RX ORDER — ALUMINA, MAGNESIA, AND SIMETHICONE 2400; 2400; 240 MG/30ML; MG/30ML; MG/30ML
15 SUSPENSION ORAL EVERY 6 HOURS PRN
Status: DISCONTINUED | OUTPATIENT
Start: 2022-11-04 | End: 2022-11-06 | Stop reason: HOSPADM

## 2022-11-04 RX ORDER — FENTANYL CITRATE 50 UG/ML
INJECTION, SOLUTION INTRAMUSCULAR; INTRAVENOUS AS NEEDED
Status: DISCONTINUED | OUTPATIENT
Start: 2022-11-04 | End: 2022-11-04 | Stop reason: SURG

## 2022-11-04 RX ORDER — CITALOPRAM 20 MG/1
40 TABLET ORAL NIGHTLY
Status: DISCONTINUED | OUTPATIENT
Start: 2022-11-04 | End: 2022-11-06 | Stop reason: HOSPADM

## 2022-11-04 RX ORDER — BUPIVACAINE HYDROCHLORIDE AND EPINEPHRINE 5; 5 MG/ML; UG/ML
INJECTION, SOLUTION EPIDURAL; INTRACAUDAL; PERINEURAL
Status: COMPLETED | OUTPATIENT
Start: 2022-11-04 | End: 2022-11-04

## 2022-11-04 RX ORDER — DEXAMETHASONE SODIUM PHOSPHATE 4 MG/ML
INJECTION, SOLUTION INTRA-ARTICULAR; INTRALESIONAL; INTRAMUSCULAR; INTRAVENOUS; SOFT TISSUE AS NEEDED
Status: DISCONTINUED | OUTPATIENT
Start: 2022-11-04 | End: 2022-11-04 | Stop reason: SURG

## 2022-11-04 RX ORDER — PROPOFOL 10 MG/ML
VIAL (ML) INTRAVENOUS AS NEEDED
Status: DISCONTINUED | OUTPATIENT
Start: 2022-11-04 | End: 2022-11-04 | Stop reason: SURG

## 2022-11-04 RX ORDER — CEFAZOLIN SODIUM IN 0.9 % NACL 3 G/100 ML
3 INTRAVENOUS SOLUTION, PIGGYBACK (ML) INTRAVENOUS ONCE
Status: DISCONTINUED | OUTPATIENT
Start: 2022-11-04 | End: 2022-11-04

## 2022-11-04 RX ORDER — FAMOTIDINE 20 MG/1
40 TABLET, FILM COATED ORAL DAILY
Status: DISCONTINUED | OUTPATIENT
Start: 2022-11-04 | End: 2022-11-04

## 2022-11-04 RX ORDER — BUPROPION HYDROCHLORIDE 150 MG/1
300 TABLET ORAL DAILY
Status: DISCONTINUED | OUTPATIENT
Start: 2022-11-05 | End: 2022-11-06 | Stop reason: HOSPADM

## 2022-11-04 RX ORDER — PHENYLEPHRINE HCL IN 0.9% NACL 1 MG/10 ML
SYRINGE (ML) INTRAVENOUS AS NEEDED
Status: DISCONTINUED | OUTPATIENT
Start: 2022-11-04 | End: 2022-11-04 | Stop reason: SURG

## 2022-11-04 RX ORDER — CEFAZOLIN SODIUM 2 G/100ML
2 INJECTION, SOLUTION INTRAVENOUS EVERY 8 HOURS
Status: COMPLETED | OUTPATIENT
Start: 2022-11-04 | End: 2022-11-05

## 2022-11-04 RX ORDER — SCOLOPAMINE TRANSDERMAL SYSTEM 1 MG/1
1 PATCH, EXTENDED RELEASE TRANSDERMAL ONCE
Status: DISCONTINUED | OUTPATIENT
Start: 2022-11-04 | End: 2022-11-06 | Stop reason: HOSPADM

## 2022-11-04 RX ORDER — PROMETHAZINE HYDROCHLORIDE 25 MG/1
25 SUPPOSITORY RECTAL ONCE AS NEEDED
Status: DISCONTINUED | OUTPATIENT
Start: 2022-11-04 | End: 2022-11-04 | Stop reason: HOSPADM

## 2022-11-04 RX ORDER — ONDANSETRON 2 MG/ML
INJECTION INTRAMUSCULAR; INTRAVENOUS AS NEEDED
Status: DISCONTINUED | OUTPATIENT
Start: 2022-11-04 | End: 2022-11-04 | Stop reason: SURG

## 2022-11-04 RX ORDER — HYDROCODONE BITARTRATE AND ACETAMINOPHEN 7.5; 325 MG/1; MG/1
2 TABLET ORAL EVERY 4 HOURS PRN
Status: DISCONTINUED | OUTPATIENT
Start: 2022-11-04 | End: 2022-11-06 | Stop reason: HOSPADM

## 2022-11-04 RX ORDER — ACETAMINOPHEN 500 MG
1000 TABLET ORAL EVERY 8 HOURS
Status: DISCONTINUED | OUTPATIENT
Start: 2022-11-04 | End: 2022-11-06 | Stop reason: HOSPADM

## 2022-11-04 RX ORDER — PROMETHAZINE HYDROCHLORIDE 12.5 MG/1
25 TABLET ORAL ONCE AS NEEDED
Status: DISCONTINUED | OUTPATIENT
Start: 2022-11-04 | End: 2022-11-04 | Stop reason: HOSPADM

## 2022-11-04 RX ORDER — AMOXICILLIN 250 MG
2 CAPSULE ORAL 2 TIMES DAILY
Status: DISCONTINUED | OUTPATIENT
Start: 2022-11-04 | End: 2022-11-06 | Stop reason: HOSPADM

## 2022-11-04 RX ORDER — ONDANSETRON 4 MG/1
4 TABLET, FILM COATED ORAL EVERY 6 HOURS PRN
Status: DISCONTINUED | OUTPATIENT
Start: 2022-11-04 | End: 2022-11-06 | Stop reason: HOSPADM

## 2022-11-04 RX ORDER — ENOXAPARIN SODIUM 100 MG/ML
40 INJECTION SUBCUTANEOUS DAILY
Status: DISCONTINUED | OUTPATIENT
Start: 2022-11-05 | End: 2022-11-06 | Stop reason: HOSPADM

## 2022-11-04 RX ORDER — FERROUS SULFATE 325(65) MG
325 TABLET ORAL
Status: DISCONTINUED | OUTPATIENT
Start: 2022-11-05 | End: 2022-11-06 | Stop reason: HOSPADM

## 2022-11-04 RX ORDER — MAGNESIUM HYDROXIDE 1200 MG/15ML
LIQUID ORAL AS NEEDED
Status: DISCONTINUED | OUTPATIENT
Start: 2022-11-04 | End: 2022-11-04 | Stop reason: HOSPADM

## 2022-11-04 RX ORDER — HYDROCODONE BITARTRATE AND ACETAMINOPHEN 7.5; 325 MG/1; MG/1
1 TABLET ORAL EVERY 4 HOURS PRN
Status: DISCONTINUED | OUTPATIENT
Start: 2022-11-04 | End: 2022-11-06 | Stop reason: HOSPADM

## 2022-11-04 RX ADMIN — CEFAZOLIN SODIUM 2 G: 2 INJECTION, SOLUTION INTRAVENOUS at 11:01

## 2022-11-04 RX ADMIN — ROCURONIUM BROMIDE 10 MG: 10 INJECTION INTRAVENOUS at 11:19

## 2022-11-04 RX ADMIN — SODIUM CHLORIDE, POTASSIUM CHLORIDE, SODIUM LACTATE AND CALCIUM CHLORIDE 9 ML/HR: 600; 310; 30; 20 INJECTION, SOLUTION INTRAVENOUS at 09:14

## 2022-11-04 RX ADMIN — SODIUM CHLORIDE, POTASSIUM CHLORIDE, SODIUM LACTATE AND CALCIUM CHLORIDE 100 ML/HR: 600; 310; 30; 20 INJECTION, SOLUTION INTRAVENOUS at 14:50

## 2022-11-04 RX ADMIN — Medication 100 MCG: at 11:21

## 2022-11-04 RX ADMIN — HYDROXYZINE HYDROCHLORIDE 30 MG: 10 TABLET ORAL at 21:12

## 2022-11-04 RX ADMIN — PROPOFOL 150 MG: 10 INJECTION, EMULSION INTRAVENOUS at 10:54

## 2022-11-04 RX ADMIN — ALUMINUM HYDROXIDE, MAGNESIUM HYDROXIDE, AND DIMETHICONE 15 ML: 400; 400; 40 SUSPENSION ORAL at 21:09

## 2022-11-04 RX ADMIN — ROCURONIUM BROMIDE 40 MG: 10 INJECTION INTRAVENOUS at 10:54

## 2022-11-04 RX ADMIN — CITALOPRAM HYDROBROMIDE 40 MG: 20 TABLET ORAL at 21:10

## 2022-11-04 RX ADMIN — LIDOCAINE HYDROCHLORIDE 60 MG: 20 INJECTION, SOLUTION EPIDURAL; INFILTRATION; INTRACAUDAL; PERINEURAL at 10:54

## 2022-11-04 RX ADMIN — CETIRIZINE HYDROCHLORIDE 10 MG: 10 TABLET, FILM COATED ORAL at 22:42

## 2022-11-04 RX ADMIN — ACETAMINOPHEN 1000 MG: 500 TABLET ORAL at 09:21

## 2022-11-04 RX ADMIN — POVIDONE-IODINE: 10 SOLUTION TOPICAL at 09:13

## 2022-11-04 RX ADMIN — TRANEXAMIC ACID 1000 MG: 10 INJECTION, SOLUTION INTRAVENOUS at 10:01

## 2022-11-04 RX ADMIN — ONDANSETRON 4 MG: 2 INJECTION INTRAMUSCULAR; INTRAVENOUS at 11:30

## 2022-11-04 RX ADMIN — DEXAMETHASONE SODIUM PHOSPHATE 4 MG: 4 INJECTION, SOLUTION INTRA-ARTICULAR; INTRALESIONAL; INTRAMUSCULAR; INTRAVENOUS; SOFT TISSUE at 11:30

## 2022-11-04 RX ADMIN — SCOPALAMINE 1 PATCH: 1 PATCH, EXTENDED RELEASE TRANSDERMAL at 09:21

## 2022-11-04 RX ADMIN — SUGAMMADEX 200 MG: 100 INJECTION, SOLUTION INTRAVENOUS at 12:22

## 2022-11-04 RX ADMIN — EPHEDRINE SULFATE 10 MG: 50 INJECTION INTRAVENOUS at 11:22

## 2022-11-04 RX ADMIN — GLYCOPYRROLATE 0.2 MG: 0.2 INJECTION INTRAMUSCULAR; INTRAVENOUS at 10:03

## 2022-11-04 RX ADMIN — SENNOSIDES AND DOCUSATE SODIUM 2 TABLET: 8.6; 5 TABLET ORAL at 21:10

## 2022-11-04 RX ADMIN — FENTANYL CITRATE 50 MCG: 50 INJECTION, SOLUTION INTRAMUSCULAR; INTRAVENOUS at 11:20

## 2022-11-04 RX ADMIN — FAMOTIDINE 40 MG: 20 TABLET ORAL at 14:50

## 2022-11-04 RX ADMIN — METOPROLOL TARTRATE 12.5 MG: 25 TABLET, FILM COATED ORAL at 21:11

## 2022-11-04 RX ADMIN — TRANEXAMIC ACID 1000 MG: 10 INJECTION, SOLUTION INTRAVENOUS at 12:00

## 2022-11-04 RX ADMIN — KETOROLAC TROMETHAMINE 15 MG: 15 INJECTION, SOLUTION INTRAMUSCULAR; INTRAVENOUS at 17:30

## 2022-11-04 RX ADMIN — FENTANYL CITRATE 50 MCG: 50 INJECTION, SOLUTION INTRAMUSCULAR; INTRAVENOUS at 10:54

## 2022-11-04 RX ADMIN — CEFAZOLIN SODIUM 2 G: 2 INJECTION, SOLUTION INTRAVENOUS at 19:18

## 2022-11-04 RX ADMIN — ACETAMINOPHEN 1000 MG: 500 TABLET ORAL at 17:30

## 2022-11-04 RX ADMIN — MEPERIDINE HYDROCHLORIDE 12.5 MG: 25 INJECTION INTRAMUSCULAR; INTRAVENOUS; SUBCUTANEOUS at 13:10

## 2022-11-04 RX ADMIN — BUPIVACAINE HYDROCHLORIDE AND EPINEPHRINE BITARTRATE 40 ML: 5; .005 INJECTION, SOLUTION EPIDURAL; INTRACAUDAL; PERINEURAL at 10:28

## 2022-11-04 RX ADMIN — MIDAZOLAM 2 MG: 1 INJECTION INTRAMUSCULAR; INTRAVENOUS at 10:03

## 2022-11-04 NOTE — SIGNIFICANT NOTE
11/04/22 1518   Plan   Plan Comments [facility] accepted patient   Final Discharge Disposition Code 06 - home with home health care

## 2022-11-04 NOTE — OP NOTE
TOTAL KNEE ARTHROPLASTY  Procedure Report    Patient Name:  Tiffani Stacy  YOB: 1960    Date of Surgery:  11/4/2022     Indications:  Severe OA, failed conservative treatment    Pre-op Diagnosis:   Primary localized osteoarthrosis of right lower leg [M17.11]       Post-Op Diagnosis Codes:     * Primary localized osteoarthrosis of right lower leg [M17.11]    Procedure/CPT® Codes:      Procedure(s):  TOTAL KNEE ARTHROPLASTY right    Surgical Approach: Knee Medial Parapatellar        Staff:  Surgeon(s):  Natalie Marques MD    Assistant: Carol Lomeli RN    Anesthesia: General with Block    Estimated Blood Loss: 100 mL    Implants:    Implant Name Type Inv. Item Serial No.  Lot No. LRB No. Used Action   CMT BONE PALACOS R HI/VISC 1X40 - [id number] Implant CMT BONE PALACOS R HI/VISC 1X40  [facility] [id number] Right 2 Implanted   PAT 3PEG STD 8X28MM - [id number] Implant PAT 3PEG STD 8X28MM  [employer] [id number] Right 1 Implanted   COMP FEM/KN VANGUARD INTLK CR 62.5MM NS RT - [id number] Implant COMP FEM/KN VANGUARD INTLK CR 62.5MM NS RT  [employer] [id number] Right 1 Implanted   TRY TIB CRUC 71MM - [id number] Implant TRY TIB CRUC 71MM  [employer] [id number] Right 1 Implanted   BEAR TIB/KN VANGUARD AS 04C20HB NS - [id number] Implant BEAR TIB/KN VANGUARD AS [id number] NS  [employer] [id number] Right 1 Implanted       Specimen:          None        Findings: advance OA    Complications: None    Description of Procedure: The patient was brought to the operating room and underwent general anesthesia, had a preoperative antibiotic, and was then prepped and draped in sterile fashion. An Esmarch was used to exsanguinate the limb. The tourniquet was then inflated. A standard medial parapatellar arthrotomy was performed. An intramedullary guide was placed in the femur. A distal femoral cut was made and measured. The 4-in-1 block was placed. Excellent cuts were achieved. Bone was removed, followed by  removal of the ACL and remaining menisci. The intramedullary guide was placed in the tibia. The tibia was then cut and measured. This was then broached. The patella was then osteotomized, removing approximately 8-10 mm of bone. It measured. There was excellent range of motion, tracking and stability of the trials. The trials were removed. Bony cut surfaces were thoroughly irrigated. The knee was then injected. The cement was vacuum mixed, placed on the undersurface of the components and then packed into place. Excess cement was removed. Once this had hardened, trial polys were tried and the appropriate sized anterior insert was then chosen. This was then locked, thoroughly irrigated. Bovie electrocautery was used for hemostasis. The tourniquet was deflated. This was again thoroughly irrigated and closed using #1 Vicryl, 2-0 Vicryl and staples. A sterile dressing was applied. The patient was then extubated and taken to the recovery room in stable condition. There were no complications.    Assistant: Carol Lomeli RN  was responsible for performing the following activities: Retraction, Suction, Irrigation, Closing and Placing Dressing and their skilled assistance was necessary for the success of this case.    Natalie Marques MD     Date: 11/4/2022  Time: 12:31 EDT

## 2022-11-04 NOTE — CONSULTS
Discharge Planning Assessment   Marc     Patient Name: Tiffani Stacy  MRN: [id number]  Today's Date: 11/4/2022    Admit Date: 11/4/2022    Plan: RN CM met with patient, facesheet reviewed. Patient notes she lives at home with her significant other, Riaz. Patient states she has family/friends to also assist her if she needs. Patient reports she is IADL. Patient will be going home with a rolling walker and 3 in 1 bedside commode, [employer] to provide. Patient plans to discharge home with home health; [facility]. Patient will be using meds to beds, CM will follow for additional discharge needs.   Discharge Needs Assessment     Row Name 11/04/22 1439       Living Environment    People in Home significant other    Name(s) of People in Home Riaz Baca    Current Living Arrangements home    Primary Care Provided by self;spouse/significant other    Provides Primary Care For no one    Family Caregiver if Needed significant other    Quality of Family Relationships helpful;involved;supportive    Able to Return to Prior Arrangements yes       Resource/Environmental Concerns    Resource/Environmental Concerns none    Transportation Concerns none       Transition Planning    Patient/Family Anticipates Transition to home with help/services    Patient/Family Anticipated Services at Transition home health care    Transportation Anticipated family or friend will provide       Discharge Needs Assessment    Readmission Within the Last 30 Days no previous admission in last 30 days    Current Outpatient/Agency/Support Group homecare agency    Equipment Currently Used at Home none    Concerns to be Addressed no discharge needs identified    Anticipated Changes Related to Illness none    Equipment Needed After Discharge commode;walker, rolling    Outpatient/Agency/Support Group Needs homecare agency    Discharge Facility/Level of Care Needs home with home health               Discharge Plan     Row Name 11/04/22 8611       Plan     Plan RN CARLYLE met with patient, facesheet reviewed. Patient notes she lives at home with her significant other, Riaz. Patient states she has family/friends to also assist her if she needs. Patient reports she is IADL. Patient will be going home with a rolling walker and 3 in 1 bedside commode, [employer] to provide. Patient plans to discharge home with home health; [facility]. Patient will be using meds to beds, CM will follow for additional discharge needs.    Final Discharge Disposition Code 06 - home with home health care              Continued Care and Services - Admitted Since 11/4/2022     Home Medical Care     Service Provider Request Status Selected Services Address Phone Fax Patient Preferred    [facility] [place] Pending - Request Sent N/A [id number] [address] [phone] --              Expected Discharge Date and Time     Expected Discharge Date Expected Discharge Time    Nov 5, 2022          Demographic Summary     Row Name 11/04/22 1432       General Information    Admission Type inpatient    Arrived From PACU/recovery room    Reason for Consult discharge planning    Preferred Language English       Contact Information    Permission Granted to Share Info With family/designee               Functional Status     Row Name 11/04/22 1432       Functional Status    Usual Activity Tolerance good    Current Activity Tolerance moderate       Assessment of Health Literacy    How often do you have someone help you read hospital materials? Occasionally    How often do you have problems learning about your medical condition because of difficulty understanding written information? Never    How often do you have a problem understanding what is told to you about your medical condition? Never    How confident are you filling out medical forms by yourself? Quite a bit    Health Literacy Good       Functional Status, IADL    Medications independent    Meal Preparation  independent    Housekeeping independent    Laundry independent    Shopping independent       Mental Status    General Appearance WDL WDL       Mental Status Summary    Recent Changes in Mental Status/Cognitive Functioning no changes               Psychosocial    No documentation.                Abuse/Neglect    No documentation.                Legal    No documentation.                Substance Abuse    No documentation.                Patient Forms    No documentation.                   Tash Rocha RN

## 2022-11-04 NOTE — ANESTHESIA PREPROCEDURE EVALUATION
Anesthesia Evaluation     Patient summary reviewed and Nursing notes reviewed                Airway   Mallampati: I  TM distance: >3 FB  Neck ROM: full  No difficulty expected  Dental    (+) lower dentures and poor dentition    Pulmonary - negative pulmonary ROS and normal exam    breath sounds clear to auscultation  Cardiovascular     Rhythm: regular  Rate: normal    (+) hypertension, valvular problems/murmurs murmur and AS, murmur,       Neuro/Psych  (+) psychiatric history,    GI/Hepatic/Renal/Endo    (+) morbid obesity, GERD well controlled,      Musculoskeletal     Abdominal    Substance History - negative use     OB/GYN negative ob/gyn ROS         Other   arthritis,      ROS/Med Hx Other: 8/24/22 Echo: Normal left ventricular systolic function with an estimated ejection fraction of 60-65%.  No regional wall motion abnormalities were observed.  Left ventricular diastolic function is consistent with impaired relaxation with elevated left ventricular filling pressure (grade Ia diastolic dysfunction with high LAP).   Mild concentric left ventricular hypertrophy, slightly more pronounced in the basal septum.  However, no significant LVOT gradient was identified on this study.  Moderately dilated left atrium.  Mild posterior mitral annular calcification with mild mitral regurgitation.  Probably trileaflet aortic valve with moderate sclerosis/calcification.  Mild aortic stenosis with peak and mean gradients across the valve of 25 and 14 mmHg, respectively.  Calculated dimensionless index = 0.62.  Mild aortic regurgitation.  Mild tricuspid regurgitation.  Estimated right ventricular systolic pressure was within normal limits.                  Anesthesia Plan    ASA 3     ERAS Protocol and general with block     intravenous induction     Anesthetic plan, risks, benefits, and alternatives have been provided, discussed and informed consent has been obtained with: patient.        CODE STATUS:

## 2022-11-04 NOTE — THERAPY EVALUATION
Acute Care - Physical Therapy Initial Evaluation   Tran     Patient Name: Tiffani Stacy  : 1960  MRN: [id number]  Today's Date: 2022       Admit date: 2022     Referring Physician: Charity Baez MD     Surgery Date:2022   Procedure(s) (LRB):  TOTAL KNEE ARTHROPLASTY right (Right)        Visit Dx:     ICD-10-CM ICD-9-CM   1. Primary osteoarthritis of right knee  M17.11 715.16   2. Primary localized osteoarthrosis of right lower leg  M17.11 715.16   3. Encounter for preadmission testing  Z01.818 V72.84   4. Difficulty in walking  R26.2 719.7     Patient Active Problem List   Diagnosis   • Primary localized osteoarthrosis of right lower leg   • Chronic pain of right knee   • Heartburn   • Epigastric pain   • Colon cancer screening   • OA (osteoarthritis) of knee     Past Medical History:   Diagnosis Date   • Acid reflux    • Anesthesia complication     slept for 3 days with wisdom teeth at young age - no problems since   • Anxiety and depression    • Aortic valve stenosis     follows w/dr. gamez   • Arthritis     knee, cervical and  thoracic   • Disc degeneration, lumbar    • Glaucoma    • Heat intolerance    • HTN (hypertension)    • Limb swelling    • Seasonal allergies      Past Surgical History:   Procedure Laterality Date   • CHOLECYSTECTOMY     • COLONOSCOPY N/A 2022    Procedure: COLONOSCOPY WITH POLYPECTOMIES;  Surgeon: Betty Rossi MD;  Location: [facility] ENDOSCOPY;  Service: Gastroenterology;  Laterality: N/A;  COLON POLYPS, HEMORRHOIDS   • CYST REMOVAL      neck   • ENDOSCOPY N/A 2022    Procedure: ESOPHAGOGASTRODUODENOSCOPY WITH BX, POLYPECTOMY;  Surgeon: Betty Rossi MD;  Location: [facility] ENDOSCOPY;  Service: Gastroenterology;  Laterality: N/A;  GASTRIC POLYPS, HIATAL HERNIA   • EYE SURGERY Bilateral    • HYSTERECTOMY     • WISDOM TOOTH EXTRACTION       PT Assessment (last 12 hours)     PT Evaluation and Treatment     Row Name 22  1455          Physical Therapy Time and Intention    Subjective Information no complaints  -PHILIP     Document Type evaluation  -PHILIP     Mode of Treatment individual therapy;physical therapy  -PHILIP     Patient Effort good  -PHILIP     Symptoms Noted During/After Treatment other (see comments)  weakness/numbess from block  -PHILIP     Row Name 11/04/22 1455          General Information    Patient Profile Reviewed yes  -PHILIP     Patient Observations alert;cooperative;agree to therapy  -PHILIP     Prior Level of Function independent:  -PHILIP     Equipment Currently Used at Home cane, straight  only occasionally  -PHILIP     Existing Precautions/Restrictions no known precautions/restrictions  -PHILIP     Barriers to Rehab none identified  -PHILIP     Row Name 11/04/22 1455          Living Environment    Current Living Arrangements home  -PHILIP     People in Home significant other  -PHILIP     Primary Care Provided by self;spouse/significant other  -PHILIP     Row Name 11/04/22 1455          Range of Motion (ROM)    Range of Motion right lower extremity  0-100° knee AROM  -PHILIP     Row Name 11/04/22 1455          Mobility    Extremity Weight-bearing Status right lower extremity  -PHILIP     Right Lower Extremity (Weight-bearing Status) weight-bearing as tolerated (WBAT)  -PHILIP     Row Name 11/04/22 1455          Bed Mobility    Bed Mobility bed mobility (all) activities  -PHILIP     All Activities, [place] (Bed Mobility) standby assist;1 person assist  -PHILIP     Row Name 11/04/22 1455          Transfers    Transfers sit-stand transfer;stand-sit transfer  -PHILIP     Row Name 11/04/22 1455          Sit-Stand Transfer    Sit-Stand [place] (Transfers) contact guard;1 person assist;verbal cues  -PHILIP     Assistive Device (Sit-Stand Transfers) walker, front-wheeled  -PHILIP     Row Name 11/04/22 1455          Stand-Sit Transfer    Stand-Sit [place] (Transfers) contact guard;verbal cues;1 person assist  -PHILIP     Assistive Device (Stand-Sit Transfers) walker, front-wheeled  -PHILIP      Row Name 11/04/22 1455          Gait/Stairs (Locomotion)    [place] Level (Gait) moderate assist (50% patient effort);maximum assist (25% patient effort);1 person assist;verbal cues  -PHILIP     Assistive Device (Gait) walker, front-wheeled  -PHILIP     Ambulated day of surgery or within 4 hours of PACU discharge yes  -PHILIP     Distance in Feet (Gait) 15  -PHILIP     Pattern (Gait) step-to  -PHILIP     Right Sided Gait Deviations knee buckling, right side  -PHILIP     Row Name 11/04/22 1455          Safety Issues, Functional Mobility    Impairments Affecting Function (Mobility) balance;coordination;endurance/activity tolerance;strength;range of motion (ROM)  -PHILIP     Row Name 11/04/22 1455          Balance    Balance Assessment standing dynamic balance  -PHILIP     Dynamic Standing Balance moderate assist;maximum assist;1-person assist;verbal cues  -PHILIP     Position/Device Used, Standing Balance walker, front-wheeled  -PHILIP     Comment, Balance secondary to knee buckling on R  -PHILIP     Row Name             Wound 11/04/22 1122 Right anterior knee Incision    Wound - Properties Group Placement Date: 11/04/22  -RL Placement Time: 1122  -RL Present on Hospital Admission: N  -RL Side: Right  -RL Orientation: anterior  -RL Location: knee  -RL Primary Wound Type: Incision  -RL    Retired Wound - Properties Group Placement Date: 11/04/22  -RL Placement Time: 1122  -RL Present on Hospital Admission: N  -RL Side: Right  -RL Orientation: anterior  -RL Location: knee  -RL Primary Wound Type: Incision  -RL    Retired Wound - Properties Group Date first assessed: 11/04/22  -RL Time first assessed: 1122  -RL Present on Hospital Admission: N  -RL Side: Right  -RL Location: knee  -RL Primary Wound Type: Incision  -RL    Row Name 11/04/22 1455          Plan of Care Review    Plan of Care Reviewed With patient  -PHILIP     Outcome Evaluation Pt presents with deficits in ambulation, strength, ROM, and balance. Skilled therapy services warranted to address  limitations and maximize function. Recommend outpatient therapy services upon discharge.  -PHILIP     Row Name 11/04/22 1455          Positioning and Restraints    Pre-Treatment Position in bed  -PHILIP     Post Treatment Position chair  -PHILIP     In Chair reclined;call light within reach  -PHILIP     Row Name 11/04/22 1455          Therapy Assessment/Plan (PT)    Patient/Family Therapy Goals Statement (PT) losing weight  -PHILIP     Rehab Potential (PT) good, to achieve stated therapy goals  -PHILIP     Criteria for Skilled Interventions Met (PT) yes;skilled treatment is necessary  -PHILIP     Therapy Frequency (PT) 2 times/day  -PHILIP     Predicted Duration of Therapy Intervention (PT) 10 days  -PHILIP     Problem List (PT) problems related to;balance;coordination;motor control;postural control;strength;range of motion (ROM)  -PHILIP     Activity Limitations Related to Problem List (PT) unable to ambulate safely;unable to transfer safely  -PHILIP     Row Name 11/04/22 1455          Physical Therapy Goals    Gait Training Goal Selection (PT) gait training, PT goal 1  -PHILIP     ROM Goal Selection (PT) ROM, PT goal 1  -PHILIP     Row Name 11/04/22 1455          Gait Training Goal 1 (PT)    Activity/Assistive Device (Gait Training Goal 1, PT) gait (walking locomotion)  -PHILIP     [place] Level (Gait Training Goal 1, PT) modified independence  -PHILIP     Distance (Gait Training Goal 1, PT) 200  -PHILIP     Time Frame (Gait Training Goal 1, PT) long term goal (LTG);10 days  -PHILIP     Row Name 11/04/22 1455          ROM Goal 1 (PT)    ROM Goal 1 (PT) R knee AROM equal to contralateral side  -PHILIP     Time Frame (ROM Goal 1, PT) long-term goal (LTG);10 days  -PHILIP           User Key  (r) = Recorded By, (t) = Taken By, (c) = Cosigned By    Initials Name Provider Type    PHILIP Hossein Herman, PT Physical Therapist    RL Anna Wadsworth, RN Registered Nurse                Physical Therapy Education     Title: PT OT SLP Therapies (Done)     Topic: Physical Therapy (Done)     Point:  Mobility training (Done)     Learning Progress Summary           Patient ZULMA Cobos VU by PHILIP at 11/4/2022 1504                               User Key     Initials Effective Dates Name Provider Type Discipline    PHILIP 06/03/21 -  Hossein Herman PT Physical Therapist PT              PT Recommendation and Plan  Anticipated Discharge Disposition (PT): home with outpatient therapy services  Planned Therapy Interventions (PT): balance training, gait training, strengthening, transfer training  Therapy Frequency (PT): 2 times/day  Plan of Care Reviewed With: patient  Outcome Evaluation: Pt presents with deficits in ambulation, strength, ROM, and balance. Skilled therapy services warranted to address limitations and maximize function. Recommend outpatient therapy services upon discharge.   Outcome Measures     Row Name 11/04/22 1500             How much help from another person do you currently need...    Turning from your back to your side while in flat bed without using bedrails? 3  -PHILIP      Moving from lying on back to sitting on the side of a flat bed without bedrails? 3  -PHILIP      Moving to and from a bed to a chair (including a wheelchair)? 3  -PHILIP      Standing up from a chair using your arms (e.g., wheelchair, bedside chair)? 3  -PHILIP      Climbing 3-5 steps with a railing? 2  -PHILIP      To walk in hospital room? 2  -PHILIP      AM-PAC 6 Clicks Score (PT) 16  -PHILIP         Functional Assessment    Outcome Measure Options AM-PAC 6 Clicks Basic Mobility (PT)  -PHILIP            User Key  (r) = Recorded By, (t) = Taken By, (c) = Cosigned By    Initials Name Provider Type    Hossein De La O PT Physical Therapist                 Time Calculation:    PT Charges     Row Name 11/04/22 1525             Time Calculation    PT Received On 11/04/22  -PHILIP      PT Goal Re-Cert Due Date 11/13/22  -PHILIP         Untimed Charges    PT Eval/Re-eval Minutes 35  -PHILIP         Total Minutes    Untimed Charges Total Minutes 35  -PHILIP       Total Minutes 35   -PHILIP            User Key  (r) = Recorded By, (t) = Taken By, (c) = Cosigned By    Initials Name Provider Type    Hossein De La O, PT Physical Therapist              Therapy Charges for Today     Code Description Service Date Service Provider Modifiers Qty    [id number] HC PT EVAL LOW COMPLEXITY 3 11/4/2022 Hossein Herman, PT GP 1          PT G-Codes  Outcome Measure Options: AM-PAC 6 Clicks Basic Mobility (PT)  AM-PAC 6 Clicks Score (PT): 16    Hossein Herman, PT  11/4/2022

## 2022-11-04 NOTE — ANESTHESIA POSTPROCEDURE EVALUATION
Patient: Tiffani Stacy    Procedure Summary     Date: 11/04/22 Room / Location: [facility] OR 06 / [facility] MAIN OR    Anesthesia Start: 1051 Anesthesia Stop: 1236    Procedure: TOTAL KNEE ARTHROPLASTY right (Right: Knee) Diagnosis:       Primary localized osteoarthrosis of right lower leg      (Primary localized osteoarthrosis of right lower leg [M17.11])    Surgeons: Natalie Marques MD Provider: Gael Mane MD    Anesthesia Type: ERAS Protocol, general with block ASA Status: 3          Anesthesia Type: ERAS Protocol, general with block    Vitals  Vitals Value Taken Time   /38 11/04/22 1236   Temp     Pulse 62 11/04/22 1238   Resp     SpO2 98 % 11/04/22 1238   Vitals shown include unvalidated device data.        Post Anesthesia Care and Evaluation    Patient location during evaluation: bedside  Patient participation: complete - patient participated  Level of consciousness: awake, responsive to verbal stimuli, responsive to light touch, responsive to noxious stimuli, responsive to painful stimuli and responsive to physical stimuli  Pain score: 1  Pain management: adequate    Airway patency: patent  Anesthetic complications: No anesthetic complications  PONV Status: none  Cardiovascular status: acceptable and stable  Respiratory status: acceptable and nasal cannula  Hydration status: acceptable    Comments: An Anesthesiologist personally participated in the most demanding procedures (including induction and emergence if applicable) in the anesthesia plan, monitored the course of anesthesia administration at frequent intervals and remained physically present and available for immediate diagnosis and treatment of emergencies.

## 2022-11-04 NOTE — ADDENDUM NOTE
Addendum  created 11/04/22 1338 by Gael Mane MD    Attestation recorded in Intraprocedure, Intraprocedure Attestations deleted, Intraprocedure Attestations filed

## 2022-11-04 NOTE — H&P
[facility]   HOSPITALIST HISTORY AND PHYSICAL  Date: 2022   Patient Name: Tiffani Stacy  : 1960  MRN: [id number]  Primary Care Physician:  Pratik Jung MD  Date of admission: 2022    Subjective   Subjective   Chief Complaint: Medical management    HPI:  Mrs. Tiffani Stacy is a pleasant 62 y.o. female who is being seen by hospitalist for medical management of chronic medical conditions.  Her PCP is Dr. Jung and she has hx of HTN, LVOT and valvular heart disease (Dr. Josué Tellez), Obesity, GERD, Hiatal hernia, Glaucoma, Seasonal allergies; anxiety and depression.  Her most recent echocardiogram was Aug 2022.  Recent ECG showed SB56 with NSSTT changes.    Currently, she is resting in recliner and is alert and conversational.  She has some R knee aching from recent surgery.  She took her BP meds this a.m. LosartanHCT and metoprolol.  Her BP has been soft: running 101/53 - 109/50.  She denies any dizziness, CP, palpitations or SOA currently.  She is on room air with sats 97%.  She had L total knee arthroplasty 22 by Dr. Marques.  She is planning on home health for rehab.  She has already ambulated a few steps on her new knee.  Pertinent History   Past Medical History:    Active Ambulatory Problems     Diagnosis Date Noted   • Primary localized osteoarthrosis of right lower leg 2021   • Chronic pain of right knee 2021   • Heartburn 2022   • Epigastric pain 2022   • Colon cancer screening 2022   • OA (osteoarthritis) of knee 10/18/2022     Resolved Ambulatory Problems     Diagnosis Date Noted   • No Resolved Ambulatory Problems     Past Medical History:   Diagnosis Date   • Acid reflux    • Anesthesia complication    • Anxiety and depression    • Aortic valve stenosis    • Arthritis    • Disc degeneration, lumbar    • Glaucoma    • Heat intolerance    • HTN (hypertension)    • Limb swelling    • Seasonal allergies        Past Surgical  History:    Past Surgical History:   Procedure Laterality Date   • CHOLECYSTECTOMY     • COLONOSCOPY N/A 04/12/2022    Procedure: COLONOSCOPY WITH POLYPECTOMIES;  Surgeon: Betty Rossi MD;  Location: [facility] ENDOSCOPY;  Service: Gastroenterology;  Laterality: N/A;  COLON POLYPS, HEMORRHOIDS   • CYST REMOVAL      neck   • ENDOSCOPY N/A 04/12/2022    Procedure: ESOPHAGOGASTRODUODENOSCOPY WITH BX, POLYPECTOMY;  Surgeon: Betty Rossi MD;  Location: [facility] ENDOSCOPY;  Service: Gastroenterology;  Laterality: N/A;  GASTRIC POLYPS, HIATAL HERNIA   • EYE SURGERY Bilateral    • HYSTERECTOMY     • WISDOM TOOTH EXTRACTION         Social History:   Lives in [place] with sig other  Non smoker  Uses cane due to knee pain  Retired    Not on home O2    Social History     Socioeconomic History   • Marital status: Single   Tobacco Use   • Smoking status: Never   • Smokeless tobacco: Never   Vaping Use   • Vaping Use: Never used   Substance and Sexual Activity   • Alcohol use: Never   • Drug use: Never   • Sexual activity: Defer       Family History:     Family History   Problem Relation Age of Onset   • Heart disease Mother    • Cancer Mother    • Arthritis Mother    • Stroke Father    • Heart disease Father    • Diabetes Father    • Arthritis Father    • Heart failure Other    • Other Other         SKIN CARCINOMA    • COPD Other    • Malig Hyperthermia Neg Hx        Home Medications (reported)  Current Outpatient Medications   Medication Instructions   • ascorbic acid (VITAMIN C) 1,000 mg, Oral, Every 7 Days   • buPROPion XL (WELLBUTRIN XL) 300 MG 24 hr tablet bupropion HCl 300 mg oral tablet extended release 24 hr take 1 tablet (300 mg) by oral route once daily   Active   • cetirizine (ZYRTEC) 10 mg, Oral, Nightly   • citalopram (CELEXA) 40 mg, Oral, Nightly   • diclofenac (VOLTAREN) 75 mg, Oral, Daily   • esomeprazole (NEXIUM) 40 mg, Oral, Daily   • fluticasone (FLONASE) 50 MCG/ACT nasal spray  2 sprays, Nasal, Daily PRN   • hydrOXYzine (ATARAX) 10 mg, Oral, 3 Times Daily PRN   • ketotifen (ZADITOR) 0.025 % ophthalmic solution 2 drops, Both Eyes, 2 Times Daily   • latanoprost (XALATAN) 0.005 % ophthalmic solution INSTILL 1 DROP INTO EACH EYE NIGHTLY   • losartan-hydrochlorothiazide (HYZAAR) 50-12.5 MG per tablet 1 tablet, Oral, Daily   • metoprolol tartrate (LOPRESSOR) 25 MG tablet Take 1 tablet by mouth twice daily   • montelukast (SINGULAIR) 10 mg, Oral, Daily   • multivitamin with minerals tablet tablet 1 tablet, Oral, Daily   • vitamin E 100 Units, Oral, Daily       Allergies:  Allergies   Allergen Reactions   • Adhesive Tape Rash   • Bactrim [Sulfamethoxazole-Trimethoprim] Swelling   • Metal Itching     gold   • Molds & Smuts Anaphylaxis   • Naproxen Swelling   • Penicillins Rash   • Travoprost Swelling   • Trichophyton Itching       REVIEW OF SYSTEMS: All other systems reviewed and are negative.   GEN: No fevers. No chills. No weight gain. No weight loss.     HEENT:   No dysphagia/odynophagia. No visual disturbance.    GI:    No N/V.  No abd pain. No diarrhea. No constipation.  No bloody/black tarry stools. No hematemesis.   CV: + chest discomfort; better with B blocker.  + palps. No lightheadedness. No syncope. No orthopnea/PND. No edema.   RESP:    No cough. No wheeze.  No increased sputum. No hemoptysis. + NICOLE.  :   No dysuria or suprapubic discomfort. No frequency.No urgency. No hesitancy. No incontinence. No hematuria. No flank pain.    MS:   + R knee joint pain and stiffness. No myalgias. RLE muscle weakness.    SKIN:   No painful or pruritic rashes.  No skin discoloration.  NEURO:  No focal numbness or weakness.  No headaches.  No ataxia. No slurred speech. No receptive/expressive aphasia.      PSYCH:   No anxiety. No depression.  ENDO:  No tremor, hair loss, heat or cold intolerance.    Objective   Objective   Vitals:   Temp:  [96.5 °F (35.8 °C)-98.2 °F (36.8 °C)] 97.5 °F (36.4 °C)  Heart  Rate:  [58-73] 64  Resp:  [12-18] 17  BP: (100-145)/(38-97) 109/50  Flow (L/min):  [2-6] 2  PHYSICAL EXAM   CON: WN. WD. NAD.   EYES:  Sclera anicteric. EOMI. Normal conjunctiva.   ENT:  Oral mucosa normal without ulcers or thrush.    NECK:  No thyromegaly. No stridor. Trachea midline.  RESP:  CTA. No wheezes. No crackles.  No work of breathing or tachypnea.   CV:  Rhythm regular. Rate WNL. 2+/6 systolic murmur noted.  No edema.  GI:  Soft, obese, and nontender. Nondistended.  Bowel sounds present.   EXT: R knee dressing intact. Ice therapy on top. Distally, RLE intact neurovascularly.  LYMPH:  No lymphedema noted.  No cervical lymphadenopathy.  PSYCH:  Alert. Oriented. Normal affect and mood.  NEURO:  CNII-XII grossly intact. No dysarthria or aphasia. No unilateral weakness or paresthesia.  SKIN: No chronic venous stasis changes or varicosities.  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 Josué Tellez)   Obesity with BMI > 39  GERD and Hiatal hernia ([place] MAHER)  Glaucoma  Seasonal allergies  Anxiety and depression.   Osteoarthritis R knee  S/P R total knee replacement 11/4/22     Plan:  Hx of HTN but BP soft currently.   Hold Losartan-HCT.  Continue B blocker.   Continue IV fluids.  Avoid dehydration in light of hx of valve disease and mild LVOT dz.  Recent A1c 6.4.  Likely will improve with more activity and weight loss.  Pt is planning to become more active; since knee replaced  Continue PPI and add prn antiemetics, scopolamine patch, antacids prn  Continue other home meds tonight  PT and OT ... patient planning home  health for rehab of her knee   Oral and IV pain control per ortho surgeon  DVT prophylaxis/Lovenox per ortho surgeon  Discussed plan with RN    DVT prophylaxis:  Medical and mechanical DVT prophylaxis orders are present.  CODE STATUS:         Electronically signed by MARTHA Murray, 11/04/22, 3:08 PM EDT.

## 2022-11-04 NOTE — ANESTHESIA PROCEDURE NOTES
Peripheral Block    Pre-sedation assessment completed: 11/4/2022 10:28 AM    Patient reassessed immediately prior to procedure    Patient location during procedure: pre-op  Start time: 11/4/2022 10:28 AM  Stop time: 11/4/2022 10:33 AM  Reason for block: at surgeon's request and post-op pain management  Performed by  Anesthesiologist: Josué Rice MD  Preanesthetic Checklist  Completed: patient identified, IV checked, site marked, risks and benefits discussed, surgical consent, monitors and equipment checked, pre-op evaluation and timeout performed  Prep:  Pt Position: supine  Sterile barriers:alcohol skin prep, partial drape, cap, washed/disinfected hands, mask and gloves  Prep: ChloraPrep  Patient monitoring: blood pressure monitoring, continuous pulse oximetry and EKG  Procedure    Sedation: yes  Performed under: local infiltration  Guidance:ultrasound guided and nerve stimulator    ULTRASOUND INTERPRETATION.  Using ultrasound guidance a 20 G gauge needle was placed in close proximity to the nerve, at which point, under ultrasound guidance anesthetic was injected in the area of the nerve and spread of the anesthesia was seen on ultrasound in close proximity thereto.  There were no abnormalities seen on ultrasound; a digital image was taken; and the patient tolerated the procedure with no complications. Images:still images obtained, printed/placed on chart    Laterality:right  Block Type:adductor canal block  Injection Technique:single-shot  Needle Type:echogenic  Needle Gauge:20 G (4in)  Resistance on Injection: none    Medications Used: bupivacaine-EPINEPHrine PF (MARCAINE w/EPI) 0.5% -[id number] injection - Injection, Adductor Canal   40 mL - 11/4/2022 10:28:00 AM      Medications  Comment:Precedex 50mcg added to solution    Post Assessment  Injection Assessment: negative aspiration for heme, no paresthesia on injection and incremental injection  Patient Tolerance:comfortable throughout  block  Complications:no  Additional Notes  The block or continuous infusion is requested by the referring physician for management of postoperative pain, or pain related to a procedure. Ultrasound guidance (deemed medically necessary). Painless injection, pt was awake and conversant during the procedure without complications. Needle and surrounding structures visualized throughout procedure. No adverse reactions or complications seen during this period. Post-procedure image showed no signs of complication, and anatomy was consistent with an uncomplicated nerve blockade.

## 2022-11-04 NOTE — PLAN OF CARE
Goal Outcome Evaluation:     Pt stable since arrival to the unit following Right Total Knee Replacement. Dressing dry and intact to right lower extremity. Ice pack has been in place. Pain has been well controlled with scheduled medications. Pt on continuous pulse ox, WNL. Pt continues to have buckling when ambulating due to block, 2x assist with walker and gait belt. Pt up to [facility] and has voided.

## 2022-11-05 PROBLEM — M17.11 PRIMARY OSTEOARTHRITIS OF RIGHT KNEE: Status: ACTIVE | Noted: 2022-11-05

## 2022-11-05 LAB
ANION GAP SERPL CALCULATED.3IONS-SCNC: 11.5 MMOL/L (ref 5–15)
BUN SERPL-MCNC: 13 MG/DL (ref 8–23)
BUN/CREAT SERPL: 15.9 (ref 7–25)
CALCIUM SPEC-SCNC: 9 MG/DL (ref 8.6–10.5)
CHLORIDE SERPL-SCNC: 100 MMOL/L (ref 98–107)
CO2 SERPL-SCNC: 24.5 MMOL/L (ref 22–29)
CREAT SERPL-MCNC: 0.82 MG/DL (ref 0.57–1)
EGFRCR SERPLBLD CKD-EPI 2021: 80.5 ML/MIN/1.73
GLUCOSE SERPL-MCNC: 135 MG/DL (ref 65–99)
HCT VFR BLD AUTO: 33.1 % (ref 34–46.6)
HGB BLD-MCNC: 11.1 G/DL (ref 12–15.9)
POTASSIUM SERPL-SCNC: 3.8 MMOL/L (ref 3.5–5.2)
SODIUM SERPL-SCNC: 136 MMOL/L (ref 136–145)

## 2022-11-05 PROCEDURE — 99232 SBSQ HOSP IP/OBS MODERATE 35: CPT | Performed by: FAMILY MEDICINE

## 2022-11-05 PROCEDURE — 97530 THERAPEUTIC ACTIVITIES: CPT

## 2022-11-05 PROCEDURE — 94799 UNLISTED PULMONARY SVC/PX: CPT

## 2022-11-05 PROCEDURE — 97116 GAIT TRAINING THERAPY: CPT

## 2022-11-05 PROCEDURE — 97110 THERAPEUTIC EXERCISES: CPT

## 2022-11-05 PROCEDURE — 25010000002 KETOROLAC TROMETHAMINE PER 15 MG: Performed by: ORTHOPAEDIC SURGERY

## 2022-11-05 PROCEDURE — 85018 HEMOGLOBIN: CPT | Performed by: ORTHOPAEDIC SURGERY

## 2022-11-05 PROCEDURE — 25010000002 ENOXAPARIN PER 10 MG: Performed by: ORTHOPAEDIC SURGERY

## 2022-11-05 PROCEDURE — 25010000002 CEFAZOLIN IN DEXTROSE 2-4 GM/100ML-% SOLUTION: Performed by: ORTHOPAEDIC SURGERY

## 2022-11-05 PROCEDURE — 80048 BASIC METABOLIC PNL TOTAL CA: CPT | Performed by: ORTHOPAEDIC SURGERY

## 2022-11-05 PROCEDURE — 97165 OT EVAL LOW COMPLEX 30 MIN: CPT

## 2022-11-05 PROCEDURE — 85014 HEMATOCRIT: CPT | Performed by: ORTHOPAEDIC SURGERY

## 2022-11-05 RX ORDER — FAMOTIDINE 20 MG/1
20 TABLET, FILM COATED ORAL DAILY PRN
Status: DISCONTINUED | OUTPATIENT
Start: 2022-11-05 | End: 2022-11-06 | Stop reason: HOSPADM

## 2022-11-05 RX ORDER — HYDROCODONE BITARTRATE AND ACETAMINOPHEN 7.5; 325 MG/1; MG/1
1 TABLET ORAL EVERY 4 HOURS PRN
Qty: 40 TABLET | Refills: 0 | Status: SHIPPED | OUTPATIENT
Start: 2022-11-05 | End: 2022-11-10 | Stop reason: SDUPTHER

## 2022-11-05 RX ORDER — HYDROXYZINE HYDROCHLORIDE 10 MG/1
30 TABLET, FILM COATED ORAL 2 TIMES DAILY PRN
Status: DISCONTINUED | OUTPATIENT
Start: 2022-11-05 | End: 2022-11-06 | Stop reason: HOSPADM

## 2022-11-05 RX ADMIN — CALCIUM CARBONATE 2 TABLET: 500 TABLET, CHEWABLE ORAL at 15:51

## 2022-11-05 RX ADMIN — FERROUS SULFATE TAB 325 MG (65 MG ELEMENTAL FE) 325 MG: 325 (65 FE) TAB at 08:37

## 2022-11-05 RX ADMIN — KETOROLAC TROMETHAMINE 15 MG: 15 INJECTION, SOLUTION INTRAMUSCULAR; INTRAVENOUS at 12:05

## 2022-11-05 RX ADMIN — CITALOPRAM HYDROBROMIDE 40 MG: 20 TABLET ORAL at 20:45

## 2022-11-05 RX ADMIN — SENNOSIDES AND DOCUSATE SODIUM 2 TABLET: 8.6; 5 TABLET ORAL at 20:44

## 2022-11-05 RX ADMIN — BUPROPION HYDROCHLORIDE 300 MG: 150 TABLET, EXTENDED RELEASE ORAL at 08:37

## 2022-11-05 RX ADMIN — CEFAZOLIN SODIUM 2 G: 2 INJECTION, SOLUTION INTRAVENOUS at 02:01

## 2022-11-05 RX ADMIN — HYDROCODONE BITARTRATE AND ACETAMINOPHEN 2 TABLET: 7.5; 325 TABLET ORAL at 18:04

## 2022-11-05 RX ADMIN — HYDROCODONE BITARTRATE AND ACETAMINOPHEN 1 TABLET: 7.5; 325 TABLET ORAL at 08:37

## 2022-11-05 RX ADMIN — HYDROCODONE BITARTRATE AND ACETAMINOPHEN 2 TABLET: 7.5; 325 TABLET ORAL at 22:15

## 2022-11-05 RX ADMIN — ENOXAPARIN SODIUM 40 MG: 100 INJECTION SUBCUTANEOUS at 08:37

## 2022-11-05 RX ADMIN — KETOROLAC TROMETHAMINE 15 MG: 15 INJECTION, SOLUTION INTRAMUSCULAR; INTRAVENOUS at 00:19

## 2022-11-05 RX ADMIN — HYDROXYZINE HYDROCHLORIDE 30 MG: 10 TABLET ORAL at 09:50

## 2022-11-05 RX ADMIN — PANTOPRAZOLE SODIUM 40 MG: 40 TABLET, DELAYED RELEASE ORAL at 06:05

## 2022-11-05 RX ADMIN — METOPROLOL TARTRATE 12.5 MG: 25 TABLET, FILM COATED ORAL at 08:37

## 2022-11-05 RX ADMIN — SENNOSIDES AND DOCUSATE SODIUM 2 TABLET: 8.6; 5 TABLET ORAL at 08:37

## 2022-11-05 RX ADMIN — METOPROLOL TARTRATE 12.5 MG: 25 TABLET, FILM COATED ORAL at 20:46

## 2022-11-05 RX ADMIN — CETIRIZINE HYDROCHLORIDE 10 MG: 10 TABLET, FILM COATED ORAL at 20:45

## 2022-11-05 RX ADMIN — KETOROLAC TROMETHAMINE 15 MG: 15 INJECTION, SOLUTION INTRAMUSCULAR; INTRAVENOUS at 06:05

## 2022-11-05 RX ADMIN — HYDROXYZINE HYDROCHLORIDE 30 MG: 10 TABLET ORAL at 20:45

## 2022-11-05 RX ADMIN — ACETAMINOPHEN 1000 MG: 500 TABLET ORAL at 00:19

## 2022-11-05 NOTE — PLAN OF CARE
Goal Outcome Evaluation:  Plan of Care Reviewed With: patient        Progress: no change  Outcome Evaluation: Patient presents with limitations that impede his/her ability to perform ADLS. The skills of a therapist are necessary to maximize independence with ADLs.  Recommend home with assist and home health

## 2022-11-05 NOTE — PLAN OF CARE
Goal Outcome Evaluation:  Plan of Care Reviewed With: patient        Progress: improving    Patient doing okay. She worked with therapy and walked about 20ft with them. She was supposed to discharge today but therapy recommended that she stay for one more night. Medicated for pain x2. Patient is 2x assist with the walker to the [facility]. Patient c/o heartburn, tums given but did not help. She is adamant about having her home nexium, MD made aware and is ok with her taking it. No other issues.    Problem: Adult Inpatient Plan of Care  Goal: Plan of Care Review  Outcome: Ongoing, Progressing  Flowsheets (Taken 11/5/2022 1636)  Progress: improving  Plan of Care Reviewed With: patient  Goal: Patient-Specific Goal (Individualized)  Outcome: Ongoing, Progressing  Goal: Absence of Hospital-Acquired Illness or Injury  Outcome: Ongoing, Progressing  Intervention: Identify and Manage Fall Risk  Recent Flowsheet Documentation  Taken 11/5/2022 0740 by Sebastian Medina RN  Safety Promotion/Fall Prevention: safety round/check completed  Intervention: Prevent Skin Injury  Recent Flowsheet Documentation  Taken 11/5/2022 0740 by Sebastian Medina RN  Body Position: position changed independently  Skin Protection: adhesive use limited  Intervention: Prevent and Manage VTE (Venous Thromboembolism) Risk  Recent Flowsheet Documentation  Taken 11/5/2022 0740 by Sebastian Medina RN  Activity Management: up in chair  VTE Prevention/Management:  • bilateral  • compression stockings on  Range of Motion: active ROM (range of motion) encouraged  Intervention: Prevent Infection  Recent Flowsheet Documentation  Taken 11/5/2022 0740 by Sebastian Medina RN  Infection Prevention:  • environmental surveillance performed  • hand hygiene promoted  • rest/sleep promoted  • single patient room provided  Goal: Optimal Comfort and Wellbeing  Outcome: Ongoing, Progressing  Intervention: Monitor Pain and Promote Comfort  Recent Flowsheet  Documentation  Taken 11/5/2022 0837 by Sebastian Medina, RN  Pain Management Interventions: see MAR  Taken 11/5/2022 0740 by Sebastian Medina, RN  Pain Management Interventions:  • care clustered  • cold applied  • pillow support provided  • quiet environment facilitated  • relaxation techniques promoted  Intervention: Provide Person-Centered Care  Recent Flowsheet Documentation  Taken 11/5/2022 0740 by Sebastian Medina, RN  Trust Relationship/Rapport:  • care explained  • empathic listening provided  • questions answered  • questions encouraged  • thoughts/feelings acknowledged  Goal: Readiness for Transition of Care  Outcome: Ongoing, Progressing

## 2022-11-05 NOTE — THERAPY TREATMENT NOTE
Acute Care - Physical Therapy Treatment Note   Marc     Patient Name: Tiffani Stacy  : 1960  MRN: [id number]  Today's Date: 2022      Visit Dx:     ICD-10-CM ICD-9-CM   1. Primary osteoarthritis of right knee  M17.11 715.16   2. Primary localized osteoarthrosis of right lower leg  M17.11 715.16   3. Encounter for preadmission testing  Z01.818 V72.84   4. Difficulty in walking  R26.2 719.7     Patient Active Problem List   Diagnosis   • Primary localized osteoarthrosis of right lower leg   • Chronic pain of right knee   • Heartburn   • Epigastric pain   • Colon cancer screening   • OA (osteoarthritis) of knee     Past Medical History:   Diagnosis Date   • Acid reflux    • Anesthesia complication     slept for 3 days with wisdom teeth at young age - no problems since   • Anxiety and depression    • Aortic valve stenosis     follows w/dr. gamez   • Arthritis     knee, cervical and  thoracic   • Disc degeneration, lumbar    • Glaucoma    • Heat intolerance    • HTN (hypertension)    • Limb swelling    • Seasonal allergies      Past Surgical History:   Procedure Laterality Date   • CHOLECYSTECTOMY     • COLONOSCOPY N/A 2022    Procedure: COLONOSCOPY WITH POLYPECTOMIES;  Surgeon: Betty Rossi MD;  Location: [facility] ENDOSCOPY;  Service: Gastroenterology;  Laterality: N/A;  COLON POLYPS, HEMORRHOIDS   • CYST REMOVAL      neck   • ENDOSCOPY N/A 2022    Procedure: ESOPHAGOGASTRODUODENOSCOPY WITH BX, POLYPECTOMY;  Surgeon: Betty Rossi MD;  Location: [facility] ENDOSCOPY;  Service: Gastroenterology;  Laterality: N/A;  GASTRIC POLYPS, HIATAL HERNIA   • EYE SURGERY Bilateral    • HYSTERECTOMY     • TOTAL KNEE ARTHROPLASTY Right 2022    Procedure: TOTAL KNEE ARTHROPLASTY right;  Surgeon: Natalie Marques MD;  Location: [facility] MAIN OR;  Service: Orthopedics;  Laterality: Right;   • WISDOM TOOTH EXTRACTION       PT Assessment (last 12 hours)     PT Evaluation and Treatment      Row Name 11/05/22 0828          Physical Therapy Time and Intention    Subjective Information complains of;pain  -TS     Document Type therapy note (daily note)  -TS     Mode of Treatment individual therapy;physical therapy  -TS     Row Name 11/05/22 0828          Pain    Pretreatment Pain Rating 2/10  -TS     Pain Location - Side/Orientation Right  -TS     Pain Location - knee  -TS     Row Name 11/05/22 0828          Cognition    Affect/Mental Status (Cognition) WNL  -TS     Row Name 11/05/22 0828          Mobility    Extremity Weight-bearing Status right lower extremity  -TS     Right Lower Extremity (Weight-bearing Status) weight-bearing as tolerated (WBAT)  -TS     Additional Documentation --  R knee daniel at times with full weight bearing  -TS     Row Name 11/05/22 0828          Transfers    Transfers sit-stand transfer;stand-sit transfer  -TS     Row Name 11/05/22 0828          Sit-Stand Transfer    Sit-Stand [place] (Transfers) contact guard;1 person assist;verbal cues  -TS     Assistive Device (Sit-Stand Transfers) walker, front-wheeled  -TS     Row Name 11/05/22 0828          Stand-Sit Transfer    Stand-Sit [place] (Transfers) contact guard;verbal cues;1 person assist  -TS     Assistive Device (Stand-Sit Transfers) walker, front-wheeled  -TS     Row Name 11/05/22 0828          Gait/Stairs (Locomotion)    Gait/Stairs Locomotion gait/ambulation independence;gait/ambulation assistive device;distance ambulated  -TS     [place] Level (Gait) minimum assist (75% patient effort);moderate assist (50% patient effort);verbal cues;other (see comments);2 person assist  Staff followed with recliner due to pt's knee bucking with full weight bearing  -TS     Assistive Device (Gait) walker, front-wheeled  -TS     Distance in Feet (Gait) 10  -TS     Pattern (Gait) step-to  -TS     Deviations/Abnormal Patterns (Gait) gait speed decreased;stride length decreased;weight shifting decreased  -TS     Right  Sided Gait Deviations knee buckling, right side  -TS     Row Name 11/05/22 0828          Safety Issues, Functional Mobility    Impairments Affecting Function (Mobility) balance;coordination;endurance/activity tolerance;strength;range of motion (ROM)  -TS     Row Name 11/05/22 0828          Motor Skills    Therapeutic Exercise hip;knee;ankle  AAROM x20 reps, recumbant position. Pt needed frequent rest breaks during exercises due to fatigue.  -TS     Row Name             Wound 11/04/22 1122 Right anterior knee Incision    Wound - Properties Group Placement Date: 11/04/22  -RL Placement Time: 1122  -RL Present on Hospital Admission: N  -RL Side: Right  -RL Orientation: anterior  -RL Location: knee  -RL Primary Wound Type: Incision  -RL    Retired Wound - Properties Group Placement Date: 11/04/22  -RL Placement Time: 1122  -RL Present on Hospital Admission: N  -RL Side: Right  -RL Orientation: anterior  -RL Location: knee  -RL Primary Wound Type: Incision  -RL    Retired Wound - Properties Group Date first assessed: 11/04/22  -RL Time first assessed: 1122  -RL Present on Hospital Admission: N  -RL Side: Right  -RL Location: knee  -RL Primary Wound Type: Incision  -RL    Row Name 11/05/22 0828          Progress Summary (PT)    Progress Toward Functional Goals (PT) progress toward functional goals is fair  -TS           User Key  (r) = Recorded By, (t) = Taken By, (c) = Cosigned By    Initials Name Provider Type    Carlos Amaya PTA Physical Therapist Assistant    RL Anna Wadsworth RN Registered Nurse            Right Knee Ther-ex   Exercise  Reps  Sets    Long arc Quads   10 2   Short arc Quads  10 2   Heel Slides  10 2   Ankle Pumps  10 2   Quad sets  10 2   Glut sets  10 2   Straight leg raise  10 2            Physical Therapy Education     Title: PT OT SLP Therapies (Done)     Topic: Physical Therapy (Done)     Point: Mobility training (Done)     Learning Progress Summary           Patient ZULMA Cobos, VU by PHILIP  at 11/4/2022 1504                               User Key     Initials Effective Dates Name Provider Type Discipline    PHILIP 06/03/21 -  Hossein Herman, PT Physical Therapist PT              PT Recommendation and Plan     Progress Summary (PT)  Progress Toward Functional Goals (PT): progress toward functional goals is fair   Outcome Measures     Row Name 11/05/22 0836 11/04/22 1500          How much help from another person do you currently need...    Turning from your back to your side while in flat bed without using bedrails? 3  -TS 3  -PHILIP     Moving from lying on back to sitting on the side of a flat bed without bedrails? 3  -TS 3  -PHILIP     Moving to and from a bed to a chair (including a wheelchair)? 2  -TS 3  -PHILIP     Standing up from a chair using your arms (e.g., wheelchair, bedside chair)? 3  -TS 3  -PHILIP     Climbing 3-5 steps with a railing? 1  -TS 2  -PHILIP     To walk in hospital room? 2  -TS 2  -PHILIP     AM-PAC 6 Clicks Score (PT) 14  -TS 16  -PHILIP        Functional Assessment    Outcome Measure Options -- AM-PAC 6 Clicks Basic Mobility (PT)  -PHILIP           User Key  (r) = Recorded By, (t) = Taken By, (c) = Cosigned By    Initials Name Provider Type    Carlos Amaya PTA Physical Therapist Assistant    Hossein De La O, PT Physical Therapist                 Time Calculation:    PT Charges     Row Name 11/05/22 0826             Time Calculation    PT Received On 11/05/22  -TS      PT Goal Re-Cert Due Date 11/13/22  -TS         Timed Charges    47475 - PT Therapeutic Exercise Minutes 20  -TS      39581 - Gait Training Minutes  6  -TS      15733 - PT Therapeutic Activity Minutes 4  -TS         Total Minutes    Timed Charges Total Minutes 30  -TS       Total Minutes 30  -TS            User Key  (r) = Recorded By, (t) = Taken By, (c) = Cosigned By    Initials Name Provider Type    Carlos Amaya PTA Physical Therapist Assistant              Therapy Charges for Today     Code Description Service Date Service  Provider Modifiers Qty    38049811433  PT THER PROC EA 15 MIN 11/5/2022 Carlos Temple, [place] GP 1    97547077498 HC GAIT TRAINING EA 15 MIN 11/5/2022 Carlos Temple PTA GP 1          PT G-Codes  Outcome Measure Options: AM-PAC 6 Clicks Basic Mobility (PT)  AM-PAC 6 Clicks Score (PT): 14    Carlos Temple PTA  11/5/2022

## 2022-11-05 NOTE — PROGRESS NOTES
Orthopedic Total Knee Progress Note    Assessment/Plan requesting home health, DVT prophylaxis, weight-bear as tolerated, follow-up in 2 weeks    Status post-right total knee arthroplasty: Doing well postoperatively.    Pain Relief: some relief    Continues current post-op course    Activity: up with assistance    Weight Bearing: WBAT     LOS: 0 days     Subjective     Post-Operative Day: 1 post-right total knee arthroplasty  Systemic or Specific Complaints: No Complaints    Objective     Vital signs in last 24 hours:  Vitals:    11/04/22 2108 11/04/22 2237 11/05/22 0337 11/05/22 0700   BP:  149/62 118/52 138/90   BP Location:  Right arm Left arm Left arm   Patient Position:  Lying Sitting Sitting   Pulse:  77 64 92   Resp:  18 20 17   Temp:  98.3 °F (36.8 °C) 97.5 °F (36.4 °C) 98 °F (36.7 °C)   TempSrc:  Oral Oral Oral   SpO2: 97% 98% 99% 97%   Weight:       Height:            General: alert, appears stated age, and cooperative   Neurovascular: Tibial nerve: Intact, Superficial peroneal nerve: Intact, and Deep peroneal nerve: Intact  Capillary refill: Normal   Wound: Wound clean and dry no evidence of infection.   Range of Motion: Limited flexsion and Limited extension   DVT Exam: No evidence of DVT seen on physical exam.      Hemoglobin   Date Value Ref Range Status   11/05/2022 11.1 (L) 12.0 - 15.9 g/dL Final     Hematocrit   Date Value Ref Range Status   11/05/2022 33.1 (L) 34.0 - 46.6 % Final        Basic Metabolic Panel    Sodium Sodium   Date Value Ref Range Status   11/05/2022 136 136 - 145 mmol/L Final      Potassium Potassium   Date Value Ref Range Status   11/05/2022 3.8 3.5 - 5.2 mmol/L Final      Chloride Chloride   Date Value Ref Range Status   11/05/2022 100 98 - 107 mmol/L Final      Bicarbonate No results found for: PLASMABICARB   BUN BUN   Date Value Ref Range Status   11/05/2022 13 8 - 23 mg/dL Final      Creatinine Creatinine   Date Value Ref Range Status   11/05/2022 0.82 0.57 - 1.00 mg/dL  Final      Calcium Calcium   Date Value Ref Range Status   11/05/2022 9.0 8.6 - 10.5 mg/dL Final      Glucose      No components found for: GLUCOSE.*      XR Knee 1 or 2 View Right   Final Result       Two views the right knee demonstrating total articular prosthesis in place.                    BRENDA BENNETT MD          Electronically Signed and Approved By: BRENDA BENNETT MD on 11/04/2022 at 13:20

## 2022-11-05 NOTE — THERAPY TREATMENT NOTE
Acute Care - Physical Therapy Treatment Note   Marc     Patient Name: Tiffani Stacy  : 1960  MRN: [id number]  Today's Date: 2022      Visit Dx:     ICD-10-CM ICD-9-CM   1. Primary osteoarthritis of right knee  M17.11 715.16   2. Primary localized osteoarthrosis of right lower leg  M17.11 715.16   3. Encounter for preadmission testing  Z01.818 V72.84   4. Difficulty in walking  R26.2 719.7   5. Decreased activities of daily living (ADL)  Z78.9 V49.89     Patient Active Problem List   Diagnosis   • Primary localized osteoarthrosis of right lower leg   • Chronic pain of right knee   • Heartburn   • Epigastric pain   • Colon cancer screening   • OA (osteoarthritis) of knee   • Primary osteoarthritis of right knee     Past Medical History:   Diagnosis Date   • Acid reflux    • Anesthesia complication     slept for 3 days with wisdom teeth at young age - no problems since   • Anxiety and depression    • Aortic valve stenosis     follows w/dr. gamez   • Arthritis     knee, cervical and  thoracic   • Disc degeneration, lumbar    • Glaucoma    • Heat intolerance    • HTN (hypertension)    • Limb swelling    • Seasonal allergies      Past Surgical History:   Procedure Laterality Date   • CHOLECYSTECTOMY     • COLONOSCOPY N/A 2022    Procedure: COLONOSCOPY WITH POLYPECTOMIES;  Surgeon: Betty Rossi MD;  Location: [facility] ENDOSCOPY;  Service: Gastroenterology;  Laterality: N/A;  COLON POLYPS, HEMORRHOIDS   • CYST REMOVAL      neck   • ENDOSCOPY N/A 2022    Procedure: ESOPHAGOGASTRODUODENOSCOPY WITH BX, POLYPECTOMY;  Surgeon: Betty Rossi MD;  Location: [facility] ENDOSCOPY;  Service: Gastroenterology;  Laterality: N/A;  GASTRIC POLYPS, HIATAL HERNIA   • EYE SURGERY Bilateral    • HYSTERECTOMY     • TOTAL KNEE ARTHROPLASTY Right 2022    Procedure: TOTAL KNEE ARTHROPLASTY right;  Surgeon: Natalie Marques MD;  Location: [facility] MAIN OR;  Service: Orthopedics;  Laterality:  Right;   • WISDOM TOOTH EXTRACTION       PT Assessment (last 12 hours)     PT Evaluation and Treatment     Row Name 11/05/22 1431 11/05/22 0828       Physical Therapy Time and Intention    Subjective Information complains of;pain  -TS complains of;pain  -TS    Document Type therapy note (daily note)  -TS therapy note (daily note)  -TS    Mode of Treatment individual therapy;physical therapy  -TS individual therapy;physical therapy  -TS    Row Name 11/05/22 1431 11/05/22 0828       Pain    Pretreatment Pain Rating 4/10  -TS 2/10  -TS    Pain Location - Side/Orientation Right  -TS Right  -TS    Pain Location - knee  -TS knee  -TS    Row Name 11/05/22 1431 11/05/22 0828       Cognition    Affect/Mental Status (Cognition) WNL  -TS WNL  -TS    Row Name 11/05/22 1431          Range of Motion (ROM)    Range of Motion right lower extremity  5-100 degrees  -TS     Row Name 11/05/22 1431 11/05/22 0828       Mobility    Extremity Weight-bearing Status right lower extremity  -TS right lower extremity  -TS    Right Lower Extremity (Weight-bearing Status) weight-bearing as tolerated (WBAT)  -TS weight-bearing as tolerated (WBAT)  -TS    Additional Documentation -- --  R knee daniel at times with full weight bearing  -TS    Row Name 11/05/22 1431 11/05/22 0828       Transfers    Transfers sit-stand transfer;stand-sit transfer  -TS sit-stand transfer;stand-sit transfer  -TS    Row Name 11/05/22 1431 11/05/22 0828       Sit-Stand Transfer    Sit-Stand [place] (Transfers) contact guard;1 person assist;verbal cues  -TS contact guard;1 person assist;verbal cues  -TS    Assistive Device (Sit-Stand Transfers) walker, front-wheeled  -TS walker, front-wheeled  -TS    Row Name 11/05/22 1431 11/05/22 0828       Stand-Sit Transfer    Stand-Sit [place] (Transfers) contact guard;verbal cues;1 person assist  -TS contact guard;verbal cues;1 person assist  -TS    Assistive Device (Stand-Sit Transfers) walker, front-wheeled  -TS walker,  front-wheeled  -TS    Row Name 11/05/22 1431 11/05/22 0828       Gait/Stairs (Locomotion)    Gait/Stairs Locomotion gait/ambulation independence;gait/ambulation assistive device;distance ambulated  -TS gait/ambulation independence;gait/ambulation assistive device;distance ambulated  -TS    [place] Level (Gait) minimum assist (75% patient effort);moderate assist (50% patient effort);verbal cues;other (see comments);2 person assist  Staff followed with recliner due to pt's knee bucking with full weight bearing  -TS minimum assist (75% patient effort);moderate assist (50% patient effort);verbal cues;other (see comments);2 person assist  Staff followed with recliner due to pt's knee bucking with full weight bearing  -TS    Assistive Device (Gait) walker, front-wheeled  -TS walker, front-wheeled  -TS    Distance in Feet (Gait) 20  -TS 10  -TS    Pattern (Gait) step-to  -TS step-to  -TS    Deviations/Abnormal Patterns (Gait) gait speed decreased;stride length decreased;weight shifting decreased  -TS gait speed decreased;stride length decreased;weight shifting decreased  -TS    Right Sided Gait Deviations knee buckling, right side  -TS knee buckling, right side  -TS    Row Name 11/05/22 1431 11/05/22 0828       Safety Issues, Functional Mobility    Impairments Affecting Function (Mobility) balance;coordination;endurance/activity tolerance;strength;range of motion (ROM)  -TS balance;coordination;endurance/activity tolerance;strength;range of motion (ROM)  -TS    Row Name 11/05/22 1431 11/05/22 0828       Motor Skills    Therapeutic Exercise hip;knee;ankle  AAROM x20 reps RLE, recumbant position  -TS hip;knee;ankle  AAROM x20 reps, recumbant position. Pt needed frequent rest breaks during exercises due to fatigue.  -TS    Row Name             Wound 11/04/22 1122 Right anterior knee Incision    Wound - Properties Group Placement Date: 11/04/22  -RL Placement Time: 1122  -RL Present on Hospital Admission: N  -RL Side:  Right  -RL Orientation: anterior  -RL Location: knee  -RL Primary Wound Type: Incision  -RL    Retired Wound - Properties Group Placement Date: 11/04/22  -RL Placement Time: 1122  -RL Present on Hospital Admission: N  -RL Side: Right  -RL Orientation: anterior  -RL Location: knee  -RL Primary Wound Type: Incision  -RL    Retired Wound - Properties Group Date first assessed: 11/04/22  -RL Time first assessed: 1122  -RL Present on Hospital Admission: N  -RL Side: Right  -RL Location: knee  -RL Primary Wound Type: Incision  -RL    Row Name 11/05/22 1431          Positioning and Restraints    Pre-Treatment Position sitting in chair/recliner  -TS     Post Treatment Position chair  -TS     In Chair reclined;call light within reach;encouraged to call for assist;exit alarm on;with family/caregiver;heels elevated  -TS     Row Name 11/05/22 1431 11/05/22 0828       Progress Summary (PT)    Progress Toward Functional Goals (PT) progress toward functional goals is fair  -TS progress toward functional goals is fair  -TS          User Key  (r) = Recorded By, (t) = Taken By, (c) = Cosigned By    Initials Name Provider Type    TS Carlos Temple PTA Physical Therapist Assistant    RL Anna Wadsworth, RN Registered Nurse            Bilateral Knee Ther-ex   Exercise  Reps  Sets    Long arc Quads   10 2   Short arc Quads  10 2   Heel Slides  10 2   Ankle Pumps  10 2   Quad sets  10 2   Glut sets  10 2   Straight leg raise  10 2            Physical Therapy Education     Title: PT OT SLP Therapies (Done)     Topic: Physical Therapy (Done)     Point: Mobility training (Done)     Learning Progress Summary           Patient Acceptance, E,TB,SHAMIR, VU,DU by  at 11/5/2022 0946    ZULMA Cobos VU by PHILIP at 11/4/2022 1504                               User Key     Initials Effective Dates Name Provider Type Discipline     06/16/21 -  Marisela Santamaria OT Occupational Therapist OT    PHILIP 06/03/21 -  Hossein Herman PT Physical Therapist PT               PT Recommendation and Plan     Progress Summary (PT)  Progress Toward Functional Goals (PT): progress toward functional goals is fair   Outcome Measures     Row Name 11/05/22 0836 11/04/22 1500          How much help from another person do you currently need...    Turning from your back to your side while in flat bed without using bedrails? 3  -TS 3  -PHILIP     Moving from lying on back to sitting on the side of a flat bed without bedrails? 3  -TS 3  -PHILIP     Moving to and from a bed to a chair (including a wheelchair)? 2  -TS 3  -PHILIP     Standing up from a chair using your arms (e.g., wheelchair, bedside chair)? 3  -TS 3  -PHILIP     Climbing 3-5 steps with a railing? 1  -TS 2  -PHILIP     To walk in hospital room? 2  -TS 2  -PHILIP     AM-PAC 6 Clicks Score (PT) 14  -TS 16  -PHILIP        Functional Assessment    Outcome Measure Options -- AM-PAC 6 Clicks Basic Mobility (PT)  -PHILIP           User Key  (r) = Recorded By, (t) = Taken By, (c) = Cosigned By    Initials Name Provider Type    Carlos Amaya PTA Physical Therapist Assistant    Hossein De La O, PT Physical Therapist                 Time Calculation:    PT Charges     Row Name 11/05/22 1429 11/05/22 0826          Time Calculation    PT Received On -- 11/05/22  -TS     PT Goal Re-Cert Due Date -- 11/13/22  -TS        Timed Charges    87119 - PT Therapeutic Exercise Minutes 19  -TS 20  -TS     76683 - Gait Training Minutes  6  -TS 6  -TS     59930 - PT Therapeutic Activity Minutes 3  -TS 4  -TS        Total Minutes    Timed Charges Total Minutes 28  -TS 30  -TS      Total Minutes 28  -TS 30  -TS           User Key  (r) = Recorded By, (t) = Taken By, (c) = Cosigned By    Initials Name Provider Type    Carlos Amaya PTA Physical Therapist Assistant              Therapy Charges for Today     Code Description Service Date Service Provider Modifiers Qty    32876885877 HC PT THER PROC EA 15 MIN 11/5/2022 Carlos Temple PTA GP 1    97941110922 HC GAIT TRAINING EA 15  MIN 11/5/2022 Carlos Temple, PTA GP 1    40561255620  PT THER PROC EA 15 MIN 11/5/2022 Carlos Temple, PTA GP 1    83336044626 HC GAIT TRAINING EA 15 MIN 11/5/2022 Carlos Temple, [place] GP 1          PT G-Codes  Outcome Measure Options: AM-PAC 6 Clicks Daily Activity (OT), Optimal Instrument  AM-PAC 6 Clicks Score (PT): 14  AM-PAC 6 Clicks Score (OT): 21    Carlos Temple PTA  11/5/2022

## 2022-11-05 NOTE — PROGRESS NOTES
[facility]   Hospitalist Progress Note       Patient Name: Tiffani Stacy  : 1960  MRN: [id number]  Primary Care Physician: Pratik Jung MD  Date of admission: 2022  Today's Date: 2022  Room / Bed:   228/1  Subjective   Chief Complaint: Medical management     HPI:  Mrs. Tiffani Stacy is a pleasant 62 y.o. female who is being seen by hospitalist for medical management of chronic medical conditions.  Her PCP is Dr. Jung and she has hx of HTN, LVOT and valvular heart disease (Dr. Josué Tellez), Obesity, GERD, Hiatal hernia, Glaucoma, Seasonal allergies; anxiety and depression.  Her most recent echocardiogram was Aug 2022.  Recent ECG showed SB56 with NSSTT changes.    Interval Followup: 2022    VSS  Labs stable  Patient having difficulty ambulating.  R leg proprioception is off.  Has been seen by ortho and likely nerve block has not fully worn off.  Therapy worried about her falling.  R knee ache continues but better with meds  BP OK  On room air      REVIEW OF SYSTEMS: All other systems reviewed and are negative.   GEN:   No fevers. No chills. No weight gain. No weight loss.     HEENT:           No dysphagia/odynophagia. No visual disturbance.    GI:                   No N/V.  No abd pain. No diarrhea. No constipation.  No bloody/black tarry stools. No hematemesis.   CV:      + chest discomfort; better with B blocker.  + palps. No lightheadedness. No syncope. No orthopnea/PND. No edema.   RESP:             No cough. No wheeze.  No increased sputum. No hemoptysis. + NICOLE.  :      No dysuria or suprapubic discomfort. No frequency.No urgency. No hesitancy. No incontinence. No hematuria. No flank pain.    MS:      + R knee joint pain and stiffness. No myalgias. RLE muscle weakness.    SKIN:   No painful or pruritic rashes.  No skin discoloration.  NEURO:          No focal numbness or weakness.  No headaches.  No ataxia. No slurred speech. No receptive/expressive aphasia.       PSYCH:           No anxiety. No depression.  ENDO:             No tremor, hair loss, heat or cold intolerance.  Objective   Temp:  [97.5 °F (36.4 °C)-98.8 °F (37.1 °C)] 98.8 °F (37.1 °C)  Heart Rate:  [61-92] 70  Resp:  [17-20] 17  BP: (100-149)/(50-90) 137/50  Flow (L/min):  [2] 2  PHYSICAL EXAM        CON:   WN. WD. NAD.   EYES:  Sclera anicteric. EOMI. Normal conjunctiva.   ENT:    Oral mucosa normal without ulcers or thrush.    NECK:             No thyromegaly. No stridor. Trachea midline.  RESP:             CTA. No wheezes. No crackles.  No work of breathing or tachypnea.   CV:      Rhythm regular. Rate WNL. 2+/6 systolic murmur noted.  No edema.  GI:                   Soft, obese, and nontender. Nondistended.  Bowel sounds present.   EXT:    R knee dressing intact. Ice therapy on top. Distally RLE has sensation. OK dorsi-plantar flexion..  LYMPH:           No lymphedema noted.  No cervical lymphadenopathy.  PSYCH:           Alert. Oriented. Normal affect and mood.  NEURO:          CNII-XII grossly intact. No dysarthria or aphasia. No unilateral weakness or paresthesia.  SKIN:   No chronic venous stasis changes or varicosities.  No cellulitis    Results from last 7 days   Lab Units 11/05/22  0454   HEMOGLOBIN g/dL 11.1*   HEMATOCRIT % 33.1*     Results from last 7 days   Lab Units 11/05/22  0454   SODIUM mmol/L 136   POTASSIUM mmol/L 3.8   CO2 mmol/L 24.5   CHLORIDE mmol/L 100   ANION GAP mmol/L 11.5   BUN mg/dL 13   CREATININE mg/dL 0.82   GLUCOSE mg/dL 135*           RESULTS REVIEWED:  I have personally reviewed the results from the time of this admission to 11/5/2022 14:36 EDT and agree with these findings:  []  Laboratory  []  Microbiology  []  Radiology  []  EKG/Telemetry   []  Cardiology/Vascular   []  Pathology  []  Old records  []  Other:  Assessment / Plan   Assessment::  Medical management      HTN  LVOT dz and valvular heart disease (Dr. Josué Tellez)   Obesity with BMI > 39  GERD and Hiatal hernia  (Kenneth NP)  Glaucoma  Seasonal allergies  Anxiety and depression.   Osteoarthritis R knee  S/P R total knee replacement 11/4/22     Plan:  Continue PT.  Change admission status.  Unsafe to return home with inability to ambulate.  Ortho following.  Hold Losartan-HCT.  Continue B blocker.   DC IV fluids.  Continue to avoid dehydration in light of hx of valve disease and mild LVOT dz.  Recent A1c 6.4.  Likely will improve with more activity and weight loss.  Pt is planning to become more active; since knee replaced  Continue PPI and add prn antiemetics, scopolamine patch, antacids prn  Continue other home meds tonight  PT and OT ... patient planning home health for rehab of her knee   Oral and IV pain control per ortho surgeon  DVT prophylaxis/Lovenox per ortho surgeon  Discussed plan with RN  DVT prophylaxis:  Medical and mechanical DVT prophylaxis orders are present.  CODE STATUS:            Electronically signed by MARTHA Murray, 11/05/22, 2:36 PM EDT.    Attending documentation:  I reviewed the above documentation and independently reviewed and rounded and evaluated the patient and discussed the care plan with VANDANA Parmar PA-C, I agree with his findings and plan as documented, what I have added to the care plan and modified is as follows in my documentation and my medical decision making; 62-year-old female with aortic valve stenosis, anxiety, depression, GERD without esophagitis, essential hypertension, underwent right knee replacement, medical service was consulted for management of chief complaint of medical management of chronic medical conditions including hypertension, heart disease valvular, GERD, blood pressure medications resumed, GERD medications for management ordered, patient failed to progress as expected, difficulty ambulating, right leg proprioception, advised continued medical monitoring and management as she is unable to safely ambulate and return to the home setting.  Closely monitoring  progression.  Interval follow-up: Patient seen and examined this afternoon, no acute distress, no acute major events, complained about GERD/reflux symptoms, as well as warmth to her right knee.  Her blood pressure and other vital signs are stable, reviewed her blood work, her hemoglobin is 11.1, she did suffer some acute blood loss anemia which is expected associated with surgery, her right knee does feel warm but there is no signs of overlying cellulitis at this time.  She continues have difficulty ambulating and has difficulty with right leg proprioception, pain is adequately controlled.  Review of systems obtained, all systems reviewed and negative except for trams fatigue, intermittent palpitations, epigastric abdominal discomfort for, right knee pain joint warmth, stiffness, restricted range of motion.  Vitals reviewed, on physical exam, well-appearing female lying on her back, no acute distress, regular rate and rhythm, due to systolic murmur, clear to auscultation bilaterally, soft nontender abdomen, right knee slightly warm to touch, swollen, distal extremity neurovascular intact, no signs of cellulitis, LUARE hose covering both legs.  Assessment as above, plan is to convert her status to inpatient for continued medical monitoring and management, watch for signs of infection, a.m. labs ordered to trend white blood cell count and hemoglobins, holding hydrochlorothiazide, losartan, continued on metoprolol 12.5 mg p.o. twice daily, Protonix 40 mg daily in addition to Maalox as needed, calcium carbonate as needed for indigestion/GERD, notify MD if she does not have adequate relief of symptoms, started on oral iron, DVT prophylaxis with Lovenox, mobilize per pathway, PT/OT, clinical course to dictate further management, discussed with nurse at the bedside.  More than 70% of the time of this patient's encounter was performed by me, this included face-to-face time, planning and coordinating, medical decision making  and critical thinking personally done by me.  -SCOT CHARLES  Electronically signed by Charity Baez MD, 11/05/22, 5:39 PM EDT.  Portions of this documentation were transcribed electronically from a voice recognition software.  I confirm all data accurately represents the service(s) I performed at today's visit.

## 2022-11-05 NOTE — THERAPY EVALUATION
Patient Name: Tiffani Stacy  : 1960    MRN: [id number]                              Today's Date: 2022       Admit Date: 2022    Visit Dx:     ICD-10-CM ICD-9-CM   1. Primary osteoarthritis of right knee  M17.11 715.16   2. Primary localized osteoarthrosis of right lower leg  M17.11 715.16   3. Encounter for preadmission testing  Z01.818 V72.84   4. Difficulty in walking  R26.2 719.7   5. Decreased activities of daily living (ADL)  Z78.9 V49.89     Patient Active Problem List   Diagnosis   • Primary localized osteoarthrosis of right lower leg   • Chronic pain of right knee   • Heartburn   • Epigastric pain   • Colon cancer screening   • OA (osteoarthritis) of knee     Past Medical History:   Diagnosis Date   • Acid reflux    • Anesthesia complication     slept for 3 days with wisdom teeth at young age - no problems since   • Anxiety and depression    • Aortic valve stenosis     follows w/dr. gamez   • Arthritis     knee, cervical and  thoracic   • Disc degeneration, lumbar    • Glaucoma    • Heat intolerance    • HTN (hypertension)    • Limb swelling    • Seasonal allergies      Past Surgical History:   Procedure Laterality Date   • CHOLECYSTECTOMY     • COLONOSCOPY N/A 2022    Procedure: COLONOSCOPY WITH POLYPECTOMIES;  Surgeon: Betty Rossi MD;  Location: [facility] ENDOSCOPY;  Service: Gastroenterology;  Laterality: N/A;  COLON POLYPS, HEMORRHOIDS   • CYST REMOVAL      neck   • ENDOSCOPY N/A 2022    Procedure: ESOPHAGOGASTRODUODENOSCOPY WITH BX, POLYPECTOMY;  Surgeon: Betty Rossi MD;  Location: [facility] ENDOSCOPY;  Service: Gastroenterology;  Laterality: N/A;  GASTRIC POLYPS, HIATAL HERNIA   • EYE SURGERY Bilateral    • HYSTERECTOMY     • TOTAL KNEE ARTHROPLASTY Right 2022    Procedure: TOTAL KNEE ARTHROPLASTY right;  Surgeon: Natalie Marques MD;  Location: [facility] MAIN OR;  Service: Orthopedics;  Laterality: Right;   • WISDOM TOOTH EXTRACTION         General Information     Row Name 11/05/22 0947 11/05/22 0929       OT Time and Intention    Document Type therapy note (daily note)  -AC evaluation  -AC    Mode of Treatment individual therapy;occupational therapy  -AC individual therapy;occupational therapy  -AC    Row Name 11/05/22 0947 11/05/22 0929       General Information    Patient Profile Reviewed yes  -AC yes  -AC    Prior Level of Function -- --  patient states she  used a cane occasionally, has a tub/shower for bathing and recently got the 3in1 commode.  -AC    Existing Precautions/Restrictions fall;weight bearing  -AC fall;weight bearing  -AC    Barriers to Rehab -- none identified  -    Row Name 11/05/22 0929          Occupational Profile    Reason for Services/Referral (Occupational Profile) Pt. is a 62year old female admitted for the above diagnosis status post right total knee replacement on 11/4/2022. Pt. referred to OT services to assess independence with ADLs and adl transfers/fx'l mobility. No previous OT services for current condition.  -     Row Name 11/05/22 0929          Living Environment    People in Home significant other  -     Row Name 11/05/22 0929          Home Main Entrance    Number of Stairs, Main Entrance eight  -AC     Stair Railings, Main Entrance railings safe and in good condition  -     Row Name 11/05/22 0947 11/05/22 0929       Cognition    Orientation Status (Cognition) oriented x 4  -AC oriented x 4  -AC    Row Name 11/05/22 09 11/05/22 0929       Safety Issues, Functional Mobility    Safety Issues Affecting Function (Mobility) -- insight into deficits/self-awareness  -AC    Impairments Affecting Function (Mobility) balance;endurance/activity tolerance;pain  -AC balance;endurance/activity tolerance;pain  -AC          User Key  (r) = Recorded By, (t) = Taken By, (c) = Cosigned By    Initials Name Provider Type    AC Marisela Santamaria OT Occupational Therapist                 Mobility/ADL's     Row Name 11/05/22 0947  11/05/22 0935       Bed Mobility    Comment, (Bed Mobility) pt. in recliner upon OT entering the room.  -AC patient in recliner upon OT entering the room.  -AC    Row Name 11/05/22 0947 11/05/22 0935       Transfers    Transfers sit-stand transfer;stand-sit transfer  -AC sit-stand transfer;stand-sit transfer  -AC    Row Name 11/05/22 0947 11/05/22 0935       Sit-Stand Transfer    Sit-Stand [place] (Transfers) contact guard;1 person assist;verbal cues  -AC contact guard;1 person assist  -AC    Assistive Device (Sit-Stand Transfers) walker, front-wheeled  -AC walker, front-wheeled  -AC    Comment, (Sit-Stand Transfer) patient was CGA for sit to/from stand x3 with rolling walker with cues for safety and technique.  -AC --    Row Name 11/05/22 0947 11/05/22 0935       Stand-Sit Transfer    Stand-Sit [place] (Transfers) contact guard;1 person assist;verbal cues  -AC contact guard;1 person assist  -AC    Assistive Device (Stand-Sit Transfers) walker, front-wheeled  -AC walker, front-wheeled  -AC    Row Name 11/05/22 0947 11/05/22 0935       Functional Mobility    Functional Mobility- Ind. Level contact guard assist;minimum assist (75% patient effort);1 person;verbal cues required  -AC contact guard assist;minimum assist (75% patient effort);1 person  -AC    Functional Mobility- Device walker, front-wheeled  -AC walker, front-wheeled  -AC    Functional Mobility- Comment patient was CGA/min A with walking in place with cues for safety and technique and right knee buckled multiple times. Patient was also CGa/min A with rolling walker for 2 steps forward/backward recliner to/from sink with right knee buckling.  -AC --    Row Name 11/05/22 0947 11/05/22 0935       Activities of Daily Living    BADL Assessment/Intervention lower body dressing;grooming  -AC --  patient is setup for upper body bathing/dressing, setup for grooming, setup for self-feeding, CGa/min A for lower body bathing/dressing, CGA for toileting.  -AC     Row Name 11/05/22 0947 11/05/22 0935       Mobility    Extremity Weight-bearing Status right lower extremity  -AC right lower extremity  -AC    Right Lower Extremity (Weight-bearing Status) weight-bearing as tolerated (WBAT)  -AC weight-bearing as tolerated (WBAT)  -AC    Row Name 11/05/22 0947          Lower Body Dressing Assessment/Training    [place] Level (Lower Body Dressing) lower body dressing skills;doff;don;socks;verbal cues;contact guard assist;minimum assist (75% patient effort)  -AC     Position (Lower Body Dressing) unsupported sitting;supported standing  -AC     Row Name 11/05/22 0947          Grooming Assessment/Training    [place] Level (Grooming) grooming skills;oral care regimen;contact guard assist;minimum assist (75% patient effort);verbal cues  -AC     Position (Grooming) sink side;supported standing  -AC           User Key  (r) = Recorded By, (t) = Taken By, (c) = Cosigned By    Initials Name Provider Type     Marisela Santamaria OT Occupational Therapist               Obj/Interventions     Row Name 11/05/22 0951 11/05/22 0938       Sensory Assessment (Somatosensory)    Sensory Assessment (Somatosensory) sensation intact  - sensation intact  -    Row Name 11/05/22 0951 11/05/22 0938       Vision Assessment/Intervention    Visual Impairment/Limitations WNL  -AC WNL  -AC    Row Name 11/05/22 0951 11/05/22 0938       Range of Motion Comprehensive    General Range of Motion bilateral upper extremity ROM WFL  -AC bilateral upper extremity ROM WFL  -AC    Row Name 11/05/22 0951 11/05/22 0938       Strength Comprehensive (MMT)    General Manual Muscle Testing (MMT) Assessment no strength deficits identified  - no strength deficits identified  -AC    Row Name 11/05/22 0951 11/05/22 0938       Motor Skills    Motor Skills coordination;functional endurance  -AC coordination;functional endurance  -AC    Coordination WFL  -AC WFL  -AC    Functional Endurance f  -AC fair  -AC    Row Name  11/05/22 0951 11/05/22 0938       Balance    Balance Assessment standing dynamic balance  -AC standing dynamic balance  -AC    Dynamic Standing Balance minimal assist;contact guard;1-person assist;verbal cues  -AC minimal assist;contact guard;1-person assist  -AC    Position/Device Used, Standing Balance supported;walker, front-wheeled  -AC supported;walker, front-wheeled  -AC    Balance Interventions standing;sit to stand;supported;dynamic;moderate challenge;occupation based/functional task  -AC --          User Key  (r) = Recorded By, (t) = Taken By, (c) = Cosigned By    Initials Name Provider Type    AC Marisela Santamaria, OT Occupational Therapist               Goals/Plan     Row Name 11/05/22 0941          Transfer Goal 1 (OT)    Activity/Assistive Device (Transfer Goal 1, OT) transfers, all  -AC     [place] Level/Cues Needed (Transfer Goal 1, OT) modified independence  -AC     Time Frame (Transfer Goal 1, OT) long term goal (LTG);10 days  -     Row Name 11/05/22 0941          Bathing Goal 1 (OT)    Activity/Device (Bathing Goal 1, OT) bathing skills, all  -AC     [place] Level/Cues Needed (Bathing Goal 1, OT) modified independence  -AC     Time Frame (Bathing Goal 1, OT) long term goal (LTG);10 days  -     Row Name 11/05/22 0941          Dressing Goal 1 (OT)    Activity/Device (Dressing Goal 1, OT) dressing skills, all  -AC     [place]/Cues Needed (Dressing Goal 1, OT) modified independence  -AC     Time Frame (Dressing Goal 1, OT) long term goal (LTG);10 days  -     Row Name 11/05/22 0941          Toileting Goal 1 (OT)    Activity/Device (Toileting Goal 1, OT) toileting skills, all  -AC     [place] Level/Cues Needed (Toileting Goal 1, OT) modified independence  -AC     Time Frame (Toileting Goal 1, OT) long term goal (LTG);10 days  -     Row Name 11/05/22 0941          Grooming Goal 1 (OT)    Activity/Device (Grooming Goal 1, OT) grooming skills, all  -AC     [place] (Grooming Goal  1, OT) modified independence  -AC     Time Frame (Grooming Goal 1, OT) long term goal (LTG);10 days  -AC     Row Name 11/05/22 0941          Problem Specific Goal 1 (OT)    Problem Specific Goal 1 (OT) patient will improve endurance to good for adls.  -AC     Time Frame (Problem Specific Goal 1, OT) long term goal (LTG);10 days  -AC     Row Name 11/05/22 0941          Therapy Assessment/Plan (OT)    Planned Therapy Interventions (OT) activity tolerance training;BADL retraining;patient/caregiver education/training;ROM/therapeutic exercise;occupation/activity based interventions;transfer/mobility retraining;functional balance retraining  -           User Key  (r) = Recorded By, (t) = Taken By, (c) = Cosigned By    Initials Name Provider Type    AC Marisela Santamaria, ROSE Occupational Therapist               Clinical Impression     Row Name 11/05/22 0939          Pain Assessment    Additional Documentation Pain Scale: FACES Pre/Post-Treatment (Group)  -     Row Name 11/05/22 0952 11/05/22 0939       Pain Scale: FACES Pre/Post-Treatment    Pain: FACES Scale, Pretreatment 2-->hurts little bit  -AC 2-->hurts little bit  -AC    Posttreatment Pain Rating 2-->hurts little bit  -AC 2-->hurts little bit  -AC    Pain Location - Side/Orientation Right  -AC Right  -AC    Pain Location lower  -AC --    Pain Location - knee  -AC knee  -AC    Row Name 11/05/22 0952 11/05/22 0939       Plan of Care Review    Plan of Care Reviewed With patient  -AC patient  -AC    Progress improving  -AC no change  -AC    Outcome Evaluation patient instructed in lower body adaptive dressing technique, weightbearing status, joint protection and safety with adl transfers. patient right knee continues to buckle, continue with therapy services in order to maximize independnece with adls.  -AC Patient presents with limitations that impede his/her ability to perform ADLS. The skills of a therapist are necessary to maximize independence with ADLs.  Recommend  home with assist and home health  -    Row Name 11/05/22 0939          Therapy Assessment/Plan (OT)    Patient/Family Therapy Goal Statement (OT) Patient wants to lose weight.  -AC     Rehab Potential (OT) good, to achieve stated therapy goals  -     Criteria for Skilled Therapeutic Interventions Met (OT) yes;meets criteria;skilled treatment is necessary  -AC     Therapy Frequency (OT) 5 times/wk  -     Row Name 11/05/22 0939          Therapy Plan Review/Discharge Plan (OT)    Equipment Needs Upon Discharge (OT) walker, rolling;commode chair;tub bench  -AC     Anticipated Discharge Disposition (OT) home with assist;home with home health  -     Row Name 11/05/22 0939          Positioning and Restraints    Pre-Treatment Position sitting in chair/recliner  -AC     Post Treatment Position chair  -AC     In Chair reclined;legs elevated;call light within reach;encouraged to call for assist;exit alarm on  -AC           User Key  (r) = Recorded By, (t) = Taken By, (c) = Cosigned By    Initials Name Provider Type    AC Marisela Santamaria, OT Occupational Therapist               Outcome Measures     Row Name 11/05/22 0942          How much help from another is currently needed...    Putting on and taking off regular lower body clothing? 3  -AC     Bathing (including washing, rinsing, and drying) 3  -AC     Toileting (which includes using toilet bed pan or urinal) 3  -AC     Putting on and taking off regular upper body clothing 4  -AC     Taking care of personal grooming (such as brushing teeth) 4  -AC     Eating meals 4  -AC     AM-PAC 6 Clicks Score (OT) 21  -     Row Name 11/05/22 0836 11/05/22 0740       How much help from another person do you currently need...    Turning from your back to your side while in flat bed without using bedrails? 3  -TS 3  -CG    Moving from lying on back to sitting on the side of a flat bed without bedrails? 3  -TS 3  -CG    Moving to and from a bed to a chair (including a wheelchair)? 2   -TS 2  -CG    Standing up from a chair using your arms (e.g., wheelchair, bedside chair)? 3  -TS 2  -CG    Climbing 3-5 steps with a railing? 1  -TS 2  -CG    To walk in hospital room? 2  -TS 2  -CG    AM-PAC 6 Clicks Score (PT) 14  -TS 14  -CG    Highest level of mobility 4 --> Transferred to chair/commode  -TS 4 --> Transferred to chair/commode  -CG    Row Name 11/05/22 0942          Functional Assessment    Outcome Measure Options AM-PAC 6 Clicks Daily Activity (OT);Optimal Instrument  -AC     Row Name 11/05/22 0942          Optimal Instrument    Optimal Instrument Optimal - 3  -AC     Bending/Stooping 2  -AC     Standing 3  -AC     Reaching 1  -AC     From the list, choose the 3 activities you would most like to be able to do without any difficulty Standing;Reaching;Bending/stooping  -AC     Total Score Optimal - 3 6  -AC           User Key  (r) = Recorded By, (t) = Taken By, (c) = Cosigned By    Initials Name Provider Type    Carlos Amaya PTA Physical Therapist Assistant    Sebastian Thurston, RN Registered Nurse    AC Marisela Santamaria, OT Occupational Therapist                  OT Recommendation and Plan  Planned Therapy Interventions (OT): activity tolerance training, BADL retraining, patient/caregiver education/training, ROM/therapeutic exercise, occupation/activity based interventions, transfer/mobility retraining, functional balance retraining  Therapy Frequency (OT): 5 times/wk  Plan of Care Review  Plan of Care Reviewed With: patient  Progress: improving  Outcome Evaluation: patient instructed in lower body adaptive dressing technique, weightbearing status, joint protection and safety with adl transfers. patient right knee continues to buckle, continue with therapy services in order to maximize independnece with adls.     Time Calculation:    Time Calculation- OT     Row Name 11/05/22 0947 11/05/22 0826          Time Calculation- OT    OT Received On 11/05/22  - --     OT Goal Re-Cert Due Date  11/14/22  -AC --        Timed Charges    00102 - Gait Training Minutes  -- 6  -TS     72912 - OT Therapeutic Activity Minutes 10  -AC --     15522 - OT Self Care/Mgmt Minutes 5  -AC --        Untimed Charges    OT Eval/Re-eval Minutes 33  -AC --        Total Minutes    Timed Charges Total Minutes 15  -AC 6  -TS     Untimed Charges Total Minutes 33  -AC --      Total Minutes 48  -AC 6  -TS           User Key  (r) = Recorded By, (t) = Taken By, (c) = Cosigned By    Initials Name Provider Type    TS Carlos Temple, PTA Physical Therapist Assistant    AC Marisela Santamaria, ROSE Occupational Therapist              Therapy Charges for Today     Code Description Service Date Service Provider Modifiers Qty    22654668763 HC OT THERAPEUTIC ACT EA 15 MIN 11/5/2022 Marisela Santamaria OT GO 1    36260489037 HC OT EVAL LOW COMPLEXITY 3 11/5/2022 Marisela Santamaria OT GO 1               Marisela Santamaria OT  11/5/2022

## 2022-11-05 NOTE — PLAN OF CARE
Goal Outcome Evaluation:  Plan of Care Reviewed With: patient        Progress: no change  Outcome Evaluation: pt. is a two person assist with walker due to her left leg still feeling like it is asleep and buckling at times.  pt. has ambulated from the bed to bedside commode and chair. vital signs stable.  pt. medicated with scheduled pain medication with relief noted.  upon discharge pt is going home with [facility].

## 2022-11-06 ENCOUNTER — READMISSION MANAGEMENT (OUTPATIENT)
Dept: CALL CENTER | Facility: HOSPITAL | Age: 62
End: 2022-11-06

## 2022-11-06 VITALS
DIASTOLIC BLOOD PRESSURE: 74 MMHG | BODY MASS INDEX: 39.35 KG/M2 | RESPIRATION RATE: 16 BRPM | TEMPERATURE: 98.2 F | SYSTOLIC BLOOD PRESSURE: 150 MMHG | HEIGHT: 62 IN | OXYGEN SATURATION: 91 % | HEART RATE: 76 BPM | WEIGHT: 213.85 LBS

## 2022-11-06 LAB
ANION GAP SERPL CALCULATED.3IONS-SCNC: 8.5 MMOL/L (ref 5–15)
BASOPHILS # BLD AUTO: 0.03 10*3/MM3 (ref 0–0.2)
BASOPHILS NFR BLD AUTO: 0.3 % (ref 0–1.5)
BUN SERPL-MCNC: 12 MG/DL (ref 8–23)
BUN/CREAT SERPL: 15.4 (ref 7–25)
CALCIUM SPEC-SCNC: 9.1 MG/DL (ref 8.6–10.5)
CHLORIDE SERPL-SCNC: 101 MMOL/L (ref 98–107)
CO2 SERPL-SCNC: 26.5 MMOL/L (ref 22–29)
CREAT SERPL-MCNC: 0.78 MG/DL (ref 0.57–1)
DEPRECATED RDW RBC AUTO: 39.5 FL (ref 37–54)
EGFRCR SERPLBLD CKD-EPI 2021: 85.5 ML/MIN/1.73
EOSINOPHIL # BLD AUTO: 0.23 10*3/MM3 (ref 0–0.4)
EOSINOPHIL NFR BLD AUTO: 2.1 % (ref 0.3–6.2)
ERYTHROCYTE [DISTWIDTH] IN BLOOD BY AUTOMATED COUNT: 12.6 % (ref 12.3–15.4)
GLUCOSE SERPL-MCNC: 153 MG/DL (ref 65–99)
HCT VFR BLD AUTO: 33 % (ref 34–46.6)
HGB BLD-MCNC: 11.2 G/DL (ref 12–15.9)
IMM GRANULOCYTES # BLD AUTO: 0.04 10*3/MM3 (ref 0–0.05)
IMM GRANULOCYTES NFR BLD AUTO: 0.4 % (ref 0–0.5)
LYMPHOCYTES # BLD AUTO: 2.29 10*3/MM3 (ref 0.7–3.1)
LYMPHOCYTES NFR BLD AUTO: 21 % (ref 19.6–45.3)
MAGNESIUM SERPL-MCNC: 1.8 MG/DL (ref 1.6–2.4)
MCH RBC QN AUTO: 29.3 PG (ref 26.6–33)
MCHC RBC AUTO-ENTMCNC: 33.9 G/DL (ref 31.5–35.7)
MCV RBC AUTO: 86.4 FL (ref 79–97)
MONOCYTES # BLD AUTO: 1.32 10*3/MM3 (ref 0.1–0.9)
MONOCYTES NFR BLD AUTO: 12.1 % (ref 5–12)
NEUTROPHILS NFR BLD AUTO: 64.1 % (ref 42.7–76)
NEUTROPHILS NFR BLD AUTO: 7 10*3/MM3 (ref 1.7–7)
NRBC BLD AUTO-RTO: 0 /100 WBC (ref 0–0.2)
PLATELET # BLD AUTO: 218 10*3/MM3 (ref 140–450)
PMV BLD AUTO: 11.5 FL (ref 6–12)
POTASSIUM SERPL-SCNC: 4.9 MMOL/L (ref 3.5–5.2)
RBC # BLD AUTO: 3.82 10*6/MM3 (ref 3.77–5.28)
SODIUM SERPL-SCNC: 136 MMOL/L (ref 136–145)
WBC NRBC COR # BLD: 10.91 10*3/MM3 (ref 3.4–10.8)

## 2022-11-06 PROCEDURE — 97116 GAIT TRAINING THERAPY: CPT

## 2022-11-06 PROCEDURE — 80048 BASIC METABOLIC PNL TOTAL CA: CPT | Performed by: PHYSICIAN ASSISTANT

## 2022-11-06 PROCEDURE — 25010000002 ENOXAPARIN PER 10 MG: Performed by: ORTHOPAEDIC SURGERY

## 2022-11-06 PROCEDURE — 99239 HOSP IP/OBS DSCHRG MGMT >30: CPT | Performed by: FAMILY MEDICINE

## 2022-11-06 PROCEDURE — 85025 COMPLETE CBC W/AUTO DIFF WBC: CPT | Performed by: PHYSICIAN ASSISTANT

## 2022-11-06 PROCEDURE — 83735 ASSAY OF MAGNESIUM: CPT | Performed by: PHYSICIAN ASSISTANT

## 2022-11-06 PROCEDURE — 97530 THERAPEUTIC ACTIVITIES: CPT

## 2022-11-06 PROCEDURE — 97535 SELF CARE MNGMENT TRAINING: CPT

## 2022-11-06 PROCEDURE — 97110 THERAPEUTIC EXERCISES: CPT

## 2022-11-06 RX ADMIN — ENOXAPARIN SODIUM 40 MG: 100 INJECTION SUBCUTANEOUS at 09:04

## 2022-11-06 RX ADMIN — HYDROCODONE BITARTRATE AND ACETAMINOPHEN 2 TABLET: 7.5; 325 TABLET ORAL at 02:00

## 2022-11-06 RX ADMIN — BUPROPION HYDROCHLORIDE 300 MG: 150 TABLET, EXTENDED RELEASE ORAL at 09:04

## 2022-11-06 RX ADMIN — METOPROLOL TARTRATE 12.5 MG: 25 TABLET, FILM COATED ORAL at 09:03

## 2022-11-06 RX ADMIN — SENNOSIDES AND DOCUSATE SODIUM 2 TABLET: 8.6; 5 TABLET ORAL at 09:04

## 2022-11-06 RX ADMIN — FERROUS SULFATE TAB 325 MG (65 MG ELEMENTAL FE) 325 MG: 325 (65 FE) TAB at 09:03

## 2022-11-06 RX ADMIN — HYDROCODONE BITARTRATE AND ACETAMINOPHEN 2 TABLET: 7.5; 325 TABLET ORAL at 05:57

## 2022-11-06 RX ADMIN — ACETAMINOPHEN 1000 MG: 500 TABLET ORAL at 00:29

## 2022-11-06 NOTE — PROGRESS NOTES
Orthopedic Total Knee Progress Note    Assessment/Plan home today, follow up 2 weeks, requesting home health, DVT prophylaxis, weight-bear as tolerated    Status post-right total knee arthroplasty: Doing well postoperatively.    Pain Relief: some relief    Continues current post-op course    Activity: up with assistance    Weight Bearing: WBAT     LOS: 0 days     Subjective     Post-Operative Day: 2 post-right total knee arthroplasty  Systemic or Specific Complaints: No Complaints    Objective     Vital signs in last 24 hours:  Vitals:    11/05/22 1925 11/05/22 2233 11/06/22 0412 11/06/22 0725   BP: 155/58 157/63 156/72 150/74   BP Location: Right arm Right arm Right arm    Patient Position: Lying Lying Sitting    Pulse: 72 66 65 76   Resp: 16 16 16    Temp: 98.9 °F (37.2 °C) 100.1 °F (37.8 °C) 98 °F (36.7 °C) 98.2 °F (36.8 °C)   TempSrc: Oral Oral Oral    SpO2: 98% 96% 95% 91%   Weight:       Height:            General: alert, appears stated age, and cooperative   Neurovascular: Tibial nerve: Intact, Superficial peroneal nerve: Intact, and Deep peroneal nerve: Intact  Capillary refill: Normal   Wound: Wound clean and dry no evidence of infection.   Range of Motion: Limited flexsion and Limited extension   DVT Exam: Calf soft      WBC   Date Value Ref Range Status   11/06/2022 10.91 (H) 3.40 - 10.80 10*3/mm3 Final     RBC   Date Value Ref Range Status   11/06/2022 3.82 3.77 - 5.28 10*6/mm3 Final     Hemoglobin   Date Value Ref Range Status   11/06/2022 11.2 (L) 12.0 - 15.9 g/dL Final     Hematocrit   Date Value Ref Range Status   11/06/2022 33.0 (L) 34.0 - 46.6 % Final     MCV   Date Value Ref Range Status   11/06/2022 86.4 79.0 - 97.0 fL Final     MCH   Date Value Ref Range Status   11/06/2022 29.3 26.6 - 33.0 pg Final     MCHC   Date Value Ref Range Status   11/06/2022 33.9 31.5 - 35.7 g/dL Final     RDW   Date Value Ref Range Status   11/06/2022 12.6 12.3 - 15.4 % Final     RDW-SD   Date Value Ref Range Status    11/06/2022 39.5 37.0 - 54.0 fl Final     MPV   Date Value Ref Range Status   11/06/2022 11.5 6.0 - 12.0 fL Final     Platelets   Date Value Ref Range Status   11/06/2022 218 140 - 450 10*3/mm3 Final     Neutrophil %   Date Value Ref Range Status   11/06/2022 64.1 42.7 - 76.0 % Final     Lymphocyte %   Date Value Ref Range Status   11/06/2022 21.0 19.6 - 45.3 % Final     Monocyte %   Date Value Ref Range Status   11/06/2022 12.1 (H) 5.0 - 12.0 % Final     Eosinophil %   Date Value Ref Range Status   11/06/2022 2.1 0.3 - 6.2 % Final     Basophil %   Date Value Ref Range Status   11/06/2022 0.3 0.0 - 1.5 % Final     Immature Grans %   Date Value Ref Range Status   11/06/2022 0.4 0.0 - 0.5 % Final     Neutrophils, Absolute   Date Value Ref Range Status   11/06/2022 7.00 1.70 - 7.00 10*3/mm3 Final     Lymphocytes, Absolute   Date Value Ref Range Status   11/06/2022 2.29 0.70 - 3.10 10*3/mm3 Final     Monocytes, Absolute   Date Value Ref Range Status   11/06/2022 1.32 (H) 0.10 - 0.90 10*3/mm3 Final     Eosinophils, Absolute   Date Value Ref Range Status   11/06/2022 0.23 0.00 - 0.40 10*3/mm3 Final     Basophils, Absolute   Date Value Ref Range Status   11/06/2022 0.03 0.00 - 0.20 10*3/mm3 Final     Immature Grans, Absolute   Date Value Ref Range Status   11/06/2022 0.04 0.00 - 0.05 10*3/mm3 Final     nRBC   Date Value Ref Range Status   11/06/2022 0.0 0.0 - 0.2 /100 WBC Final        Basic Metabolic Panel    Sodium Sodium   Date Value Ref Range Status   11/06/2022 136 136 - 145 mmol/L Final   11/05/2022 136 136 - 145 mmol/L Final      Potassium Potassium   Date Value Ref Range Status   11/06/2022 4.9 3.5 - 5.2 mmol/L Final   11/05/2022 3.8 3.5 - 5.2 mmol/L Final      Chloride Chloride   Date Value Ref Range Status   11/06/2022 101 98 - 107 mmol/L Final   11/05/2022 100 98 - 107 mmol/L Final      Bicarbonate No results found for: PLASMABICARB   BUN BUN   Date Value Ref Range Status   11/06/2022 12 8 - 23 mg/dL Final    11/05/2022 13 8 - 23 mg/dL Final      Creatinine Creatinine   Date Value Ref Range Status   11/06/2022 0.78 0.57 - 1.00 mg/dL Final   11/05/2022 0.82 0.57 - 1.00 mg/dL Final      Calcium Calcium   Date Value Ref Range Status   11/06/2022 9.1 8.6 - 10.5 mg/dL Final   11/05/2022 9.0 8.6 - 10.5 mg/dL Final      Glucose      No components found for: GLUCOSE.*      XR Knee 1 or 2 View Right   Final Result       Two views the right knee demonstrating total articular prosthesis in place.                    BRENDA BENNETT MD          Electronically Signed and Approved By: BRENDA BENNETT MD on 11/04/2022 at 13:20

## 2022-11-06 NOTE — DISCHARGE SUMMARY
[facility]  HOSPITALIST  DISCHARGE SUMMARY       Patient Name: Tiffani Stacy  : 1960  MRN: [id number]  Primary Care Physician: Pratik Jung MD    Date of Admission: 2022  Date of Discharge: 2022    Discharge Diagnoses     Gait disturbance, inability to ambulate safely, resolved   HTN  LVOT dz and valvular heart disease (Dr. Josué Tellez)   Obesity with BMI > 39  GERD and Hiatal hernia (Aguila MAHER)  Glaucoma  Seasonal allergies  Anxiety and depression.   Osteoarthritis R knee  S/P R total knee replacement 22  Hospital Course   Hospital Course:  Tiffani Stacy is a pleasant 62 y.o. female who had R TKR on 22.  Orthopedist, Dr. Marques.  Hospitalist asked to see for general medical management.  Losartan held after surgery due to low BP.  This resolved with IV fluids and supportive care.  She worked with PT after surgery.  Initially, she had some persistent paresthesia in RLE from nerve block, after the surgery.  This affected her gait and there were concerns about returning home and risk of falling.  She was kept in hospital another day and worked with PT multiple times.  The following day, she had improved sensation / proprioception of RLE and was doing well from therapy standpoint.  Her BP was normal.  Her pain was reasonably controlled.  She is now eager to return home.  Home health will be set up. Resume home meds.  Eliquis and Norco Rx given per orthopedic surgeon for DVT prophylaxis and pain control, respectively.  She will be following up in ortho clinic in 2 weeks.    Discharge Follow Up / Recommendations (labs, diagnostics, meds, etc):   As above  Future Appointments   Date Time Provider Department Center   2022 10:00 AM Tonie Cordero PA [facility] ORS [facility]   2/10/2023 10:00 AM Josué Tellez MD [facility] CD [facility]   2023 11:30 AM Jacinta Sheehan APRN [facility] GE ET [place]     Consultants     Consults     Date and Time Order Name Status Description     11/4/2022  1:51 PM Inpatient Hospitalist Consult          On Day of Discharge   VS: Temp:  [98 °F (36.7 °C)-100.1 °F (37.8 °C)] 98.2 °F (36.8 °C)  Heart Rate:  [63-76] 76  Resp:  [16-17] 16  BP: (137-157)/(50-74) 150/74  EXAM:  (refer to progress note from 11/6/2022)     Discharge Medications      New Medications      Instructions Start Date   Eliquis 2.5 MG tablet tablet  Generic drug: apixaban   2.5 mg, Oral, Every 12 Hours Scheduled, Once Eliquis is completed, begin enteric-coated aspirin 325 mg p.o. daily for 4 weeks      HYDROcodone-acetaminophen 7.5-325 MG per tablet  Commonly known as: Norco   1 tablet, Oral, Every 4 Hours PRN         Continue These Medications      Instructions Start Date   ascorbic acid 1000 MG tablet  Commonly known as: VITAMIN C   1,000 mg, Oral, Every 7 Days      buPROPion  MG 24 hr tablet  Commonly known as: WELLBUTRIN XL   Take 1 tablet by mouth Every Morning.      cetirizine 10 MG tablet  Commonly known as: zyrTEC   10 mg, Oral, Nightly      citalopram 40 MG tablet  Commonly known as: CeleXA   40 mg, Oral, Nightly      esomeprazole 40 MG capsule  Commonly known as: nexIUM   40 mg, Oral, Daily      fluticasone 50 MCG/ACT nasal spray  Commonly known as: FLONASE   2 sprays, Nasal, Daily PRN      hydrOXYzine 10 MG tablet  Commonly known as: ATARAX   10 mg, Oral, 3 Times Daily PRN      ketotifen 0.025 % ophthalmic solution  Commonly known as: ZADITOR   2 drops, Both Eyes, 2 Times Daily      latanoprost 0.005 % ophthalmic solution  Commonly known as: XALATAN   Administer 1 drop to both eyes Every Night.      losartan-hydrochlorothiazide 50-12.5 MG per tablet  Commonly known as: HYZAAR   1 tablet, Oral, Daily      metoprolol tartrate 25 MG tablet  Commonly known as: LOPRESSOR   Take 1 tablet by mouth twice daily      montelukast 10 MG tablet  Commonly known as: SINGULAIR   10 mg, Oral, Daily      multivitamin with minerals tablet tablet   1 tablet, Oral, Daily      vitamin E 100 UNIT  capsule   100 Units, Oral, Daily         Stop These Medications    diclofenac 75 MG EC tablet  Commonly known as: VOLTAREN          Procedures   R TKR  Imaging     XR Knee 1 or 2 View Right    Result Date: 11/4/2022  PROCEDURE: XR KNEE 1 OR 2 VW RIGHT  COMPARISON: [facility], XR KNEE 3 VW RIGHT, 10/18/2022, 14:08.  INDICATIONS: Post-Op Right Knee Arthoplasty  FINDINGS:  Total articular prosthesis is in place.  No fractures are visualized.  Skin clips are seen anteriorly.        Two views the right knee demonstrating total articular prosthesis in place.  BRENDA BENNETT MD       Electronically Signed and Approved By: BRENDA BENNETT MD on 11/04/2022 at 13:20             XR Knee 3 View Right    Result Date: 10/18/2022  X-Ray Report: Right knee(s) X-Ray Indication: Evaluation of right knee AP, Lateral and Standing view(s) Findings: Severe arthritis. Bone on bone.  No fracture. Prior studies available for comparison: yes        Peripheral Block    Result Date: 11/4/2022  Josué Rice MD     11/4/2022 10:46 AM Peripheral Block Pre-sedation assessment completed: 11/4/2022 10:28 AM Patient reassessed immediately prior to procedure Patient location during procedure: pre-op Start time: 11/4/2022 10:28 AM Stop time: 11/4/2022 10:33 AM Reason for block: at surgeon's request and post-op pain management Performed by Anesthesiologist: Josué Rice MD Preanesthetic Checklist Completed: patient identified, IV checked, site marked, risks and benefits discussed, surgical consent, monitors and equipment checked, pre-op evaluation and timeout performed Prep: Pt Position: supine Sterile barriers:alcohol skin prep, partial drape, cap, washed/disinfected hands, mask and gloves Prep: ChloraPrep Patient monitoring: blood pressure monitoring, continuous pulse oximetry and EKG Procedure Sedation: yes Performed under: local infiltration Guidance:ultrasound guided and nerve stimulator ULTRASOUND INTERPRETATION.  Using ultrasound  guidance a 20 G gauge needle was placed in close proximity to the nerve, at which point, under ultrasound guidance anesthetic was injected in the area of the nerve and spread of the anesthesia was seen on ultrasound in close proximity thereto.  There were no abnormalities seen on ultrasound; a digital image was taken; and the patient tolerated the procedure with no complications. Images:still images obtained, printed/placed on chart Laterality:right Block Type:adductor canal block Injection Technique:single-shot Needle Type:echogenic Needle Gauge:20 G (4in) Resistance on Injection: none Medications Used: bupivacaine-EPINEPHrine PF (MARCAINE w/EPI) 0.5% -1:[id number] injection - Injection, Adductor Canal  40 mL - 11/4/2022 10:28:00 AM Medications Comment:Precedex 50mcg added to solution Post Assessment Injection Assessment: negative aspiration for heme, no paresthesia on injection and incremental injection Patient Tolerance:comfortable throughout block Complications:no Additional Notes The block or continuous infusion is requested by the referring physician for management of postoperative pain, or pain related to a procedure. Ultrasound guidance (deemed medically necessary). Painless injection, pt was awake and conversant during the procedure without complications. Needle and surrounding structures visualized throughout procedure. No adverse reactions or complications seen during this period. Post-procedure image showed no signs of complication, and anatomy was consistent with an uncomplicated nerve blockade.     Discharge Details   Hospital Diet:     Diet Order   Procedures   • Diet Regular     CODE STATUS:    There are no questions and answers to display.     Additional Instructions for the Follow-ups that You Need to Schedule     Ambulatory Referral to Home Health (Outpatient)   As directed      Face to Face Visit Date: 11/5/2022    Follow-up provider for Plan of Care?: I will be treating the patient on an ongoing  basis.  Please send me the Plan of Care for signature.    Follow-up provider: GARETT MARQUES [[id number]]    Reason/Clinical Findings: Status post right total knee    Describe mobility limitations that make leaving home difficult: Status post right total knee    Nursing/Therapeutic Services Requested: Occupational Therapy Physical Therapy Skilled Nursing    Skilled nursing orders: Pain management Wound care dressing/changes    PT orders: Total joint pathway Therapeutic exercise Gait Training Strengthening    Weight Bearing Status: As Tolerated    Occupational orders: Activities of daily living Home safety assessment Strengthening    Frequency: 1 Week 1         Ambulatory Referral to Physical Therapy Evaluate and treat, POST OP   As directed      2-3x/week for 8-12 weeks  + modalities    Order Comments: 2-3x/week for 8-12 weeks + modalities     Specialty needed: Evaluate and treat POST OP    Follow-up needed: Yes         Discharge Follow-up with Specified Provider: Dr Marques's office; 2 Weeks   As directed      To: Dr Marques's office    Follow Up: 2 Weeks             Discharge Disposition: Home or Self Care  Pertinent  Labs   LAB RESULTS:      Lab 11/06/22 0520 11/05/22 0454   WBC 10.91*  --    HEMOGLOBIN 11.2* 11.1*   HEMATOCRIT 33.0* 33.1*   PLATELETS 218  --    NEUTROS ABS 7.00  --    IMMATURE GRANS (ABS) 0.04  --    LYMPHS ABS 2.29  --    MONOS ABS 1.32*  --    EOS ABS 0.23  --    MCV 86.4  --          Lab 11/06/22 0520 11/05/22  0454   SODIUM 136 136   POTASSIUM 4.9 3.8   CHLORIDE 101 100   CO2 26.5 24.5   ANION GAP 8.5 11.5   BUN 12 13   CREATININE 0.78 0.82   EGFR 85.5 80.5   GLUCOSE 153* 135*   CALCIUM 9.1 9.0   MAGNESIUM 1.8  --                          Brief Urine Lab Results     None        Microbiology Results (last 10 days)     ** No results found for the last 240 hours. **        Labs Pending at Discharge:   Time spent on Discharge including face to face service: > 30 minutes  Electronically signed by  MARTHA Murray, 11/06/22, 10:03 AM EST.       Attending documentation:  I reviewed the above documentation and independently reviewed and rounded and evaluated the patient and discussed the care plan with VANDANA Parmar PA-C, I agree with his findings and plan as documented, what I have added to the care plan and modified is as follows in my documentation and my medical decision making; 62-year-old female with aortic valve stenosis, anxiety, depression, GERD without esophagitis, essential hypertension, underwent right knee replacement, medical service was consulted for management of chief complaint of medical management of chronic medical conditions including hypertension, heart disease valvular, GERD, blood pressure medications resumed, GERD medications for management ordered, patient failed to progress as expected, difficulty ambulating, right leg proprioception, advised continued medical monitoring and management as she is unable to safely ambulate and return to the home setting.  Closely monitored progression.  She did well with therapy on 11/6/2022, warmth had decreased, swelling remained, nerve block wore off, sensation returned to the right lower extremity, GERD symptoms improved.  She was discharged in hemodynamically stable condition on 11/6/2022 to follow-up with PCP within 1 week and orthopedist in 2 weeks, to pursue outpatient therapy.  Seen and examined the day of discharge, no acute distress, no acute major night events, patient sitting in bedside chair, review of systems obtained, all systems reviewed and negative except generalized fatigue, generalized weakness, right lower extremity knee pain.  Vitals reviewed on physical exam well-appearing female, no acute distress, regular rate rhythm, clear auscultation bilaterally, soft nontender abdomen, no lower extreme edema, right leg neurovascular intact distally.  Reviewed discharge plan, all questions answered, total discharge time 40 minutes.  More than  75% of the time of this patient's encounter was performed by me, this included face-to-face time, planning and coordinating, medical decision making and critical thinking personally done by me.  -CSOT CHARLES    Electronically signed by Charity Baez MD, 11/06/22, 4:11 PM EST.  Portions of this documentation were transcribed electronically from a voice recognition software.  I confirm all data accurately represents the service(s) I performed at today's visit.

## 2022-11-06 NOTE — PLAN OF CARE
Goal Outcome Evaluation:  Plan of Care Reviewed With: patient        Progress: no change  Outcome Evaluation: pt. is a one person assist with walker.  pt. stated that she can feel the floor under her foot now.  pt. medicated x2 for pain with pt. stating her pain was tolerable at present.  pt. has ambulated to the bedside commode and in the hallway for a total 50 ft this shift.  upon discharge pt. is going home and will be seen by [facility].

## 2022-11-06 NOTE — THERAPY TREATMENT NOTE
Acute Care - Physical Therapy Progress Note   Marc     Patient Name: Tiffani Stacy  : 1960  MRN: [id number]  Today's Date: 2022      Visit Dx:     ICD-10-CM ICD-9-CM   1. Primary osteoarthritis of right knee  M17.11 715.16   2. Primary localized osteoarthrosis of right lower leg  M17.11 715.16   3. Encounter for preadmission testing  Z01.818 V72.84   4. Difficulty in walking  R26.2 719.7   5. Decreased activities of daily living (ADL)  Z78.9 V49.89     Patient Active Problem List   Diagnosis   • Primary localized osteoarthrosis of right lower leg   • Chronic pain of right knee   • Heartburn   • Epigastric pain   • Colon cancer screening   • OA (osteoarthritis) of knee   • Primary osteoarthritis of right knee     Past Medical History:   Diagnosis Date   • Acid reflux    • Anesthesia complication     slept for 3 days with wisdom teeth at young age - no problems since   • Anxiety and depression    • Aortic valve stenosis     follows w/dr. gamez   • Arthritis     knee, cervical and  thoracic   • Disc degeneration, lumbar    • Glaucoma    • Heat intolerance    • HTN (hypertension)    • Limb swelling    • Seasonal allergies      Past Surgical History:   Procedure Laterality Date   • CHOLECYSTECTOMY     • COLONOSCOPY N/A 2022    Procedure: COLONOSCOPY WITH POLYPECTOMIES;  Surgeon: Betty Rossi MD;  Location: [facility] ENDOSCOPY;  Service: Gastroenterology;  Laterality: N/A;  COLON POLYPS, HEMORRHOIDS   • CYST REMOVAL      neck   • ENDOSCOPY N/A 2022    Procedure: ESOPHAGOGASTRODUODENOSCOPY WITH BX, POLYPECTOMY;  Surgeon: Betty Rossi MD;  Location: [facility] ENDOSCOPY;  Service: Gastroenterology;  Laterality: N/A;  GASTRIC POLYPS, HIATAL HERNIA   • EYE SURGERY Bilateral    • HYSTERECTOMY     • TOTAL KNEE ARTHROPLASTY Right 2022    Procedure: TOTAL KNEE ARTHROPLASTY right;  Surgeon: Natalie Marques MD;  Location: [facility] MAIN OR;  Service: Orthopedics;  Laterality:  Right;   • WISDOM TOOTH EXTRACTION       PT Assessment (last 12 hours)     PT Evaluation and Treatment     Row Name 11/06/22 0900          Physical Therapy Time and Intention    Subjective Information complains of;pain  -CS     Document Type therapy note (daily note)  -CS     Mode of Treatment individual therapy;physical therapy  -CS     Patient Effort good  -CS     Symptoms Noted During/After Treatment increased pain  -CS     Row Name 11/06/22 0900          Pain    Pretreatment Pain Rating 2/10  -CS     Posttreatment Pain Rating 4/10  -CS     Pain Location - Side/Orientation Right  -CS     Pain Location generalized  -CS     Pain Location - knee  -CS     Pain Intervention(s) Cold applied;Repositioned  -CS     Row Name 11/06/22 0900          Transfers    Transfers car transfer  -CS     Row Name 11/06/22 0900          Sit-Stand Transfer    Sit-Stand [place] (Transfers) contact guard  -CS     Assistive Device (Sit-Stand Transfers) walker, front-wheeled  -CS     Row Name 11/06/22 0900          Stand-Sit Transfer    Stand-Sit [place] (Transfers) contact guard  -CS     Assistive Device (Stand-Sit Transfers) walker, front-wheeled  -CS     Row Name 11/06/22 0900          Car Transfer    Type (Car Transfer) sit-stand;stand-sit  -CS     [place] Level (Car Transfer) standby assist  -CS     Assistive Device (Car Transfer) leg ;walker, front-wheeled  -CS     Row Name 11/06/22 0900          Gait/Stairs (Locomotion)    [place] Level (Gait) contact guard  -CS     Assistive Device (Gait) walker, front-wheeled  -CS     Distance in Feet (Gait) 110  plus an additional 60'  -CS     Pattern (Gait) 4-point;step-through  -CS     Deviations/Abnormal Patterns (Gait) gait speed decreased;stride length decreased;weight shifting decreased  -CS     Bilateral Gait Deviations forward flexed posture  -CS     Gait Assessment/Intervention no knee buckling observed today  -CS     [place] Level (Stairs) minimum assist  (75% patient effort);1 person assist  -CS     Handrail Location (Stairs) left side (ascending);right side (descending)  -CS     Number of Steps (Stairs) 10  -CS     Ascending Technique (Stairs) step-to-step  -CS     Descending Technique (Stairs) step-to-step  -CS     Stairs, Impairments ROM decreased;strength decreased;impaired balance;pain  -CS     Row Name             Wound 11/04/22 1122 Right anterior knee Incision    Wound - Properties Group Placement Date: 11/04/22  -RL Placement Time: 1122  -RL Present on Hospital Admission: N  -RL Side: Right  -RL Orientation: anterior  -RL Location: knee  -RL Primary Wound Type: Incision  -RL    Retired Wound - Properties Group Placement Date: 11/04/22  -RL Placement Time: 1122  -RL Present on Hospital Admission: N  -RL Side: Right  -RL Orientation: anterior  -RL Location: knee  -RL Primary Wound Type: Incision  -RL    Retired Wound - Properties Group Date first assessed: 11/04/22  -RL Time first assessed: 1122  -RL Present on Hospital Admission: N  -RL Side: Right  -RL Location: knee  -RL Primary Wound Type: Incision  -RL    Row Name 11/06/22 0900          Positioning and Restraints    Pre-Treatment Position sitting in chair/recliner  -CS     Post Treatment Position chair  -CS     In Chair reclined;call light within reach;encouraged to call for assist  -CS     Row Name 11/06/22 0900          Progress Summary (PT)    Progress Toward Functional Goals (PT) progress toward functional goals is good  -CS           User Key  (r) = Recorded By, (t) = Taken By, (c) = Cosigned By    Initials Name Provider Type    CS Jones Wilkerson PTA Physical Therapist Assistant    RL Anna Wadsworth, RN Registered Nurse                Physical Therapy Education     Title: PT OT SLP Therapies (Done)     Topic: Physical Therapy (Done)     Point: Mobility training (Done)     Learning Progress Summary           Patient Acceptance, ZULMA,SHAMIR DAWN, DAINA,DU by  at 11/5/2022 0946    ZULMA Cobos VU by PHILIP at  11/4/2022 1504                               User Key     Initials Effective Dates Name Provider Type Discipline    AC 06/16/21 -  Marisela Santamaria OT Occupational Therapist OT    PHILIP 06/03/21 -  Hossein Herman, PT Physical Therapist PT              PT Recommendation and Plan     Progress Summary (PT)  Progress Toward Functional Goals (PT): progress toward functional goals is good   Outcome Measures     Row Name 11/06/22 0900 11/05/22 0836 11/04/22 1500       How much help from another person do you currently need...    Turning from your back to your side while in flat bed without using bedrails? 3  -CS 3  -TS 3  -PHILIP    Moving from lying on back to sitting on the side of a flat bed without bedrails? 3  -CS 3  -TS 3  -PHILIP    Moving to and from a bed to a chair (including a wheelchair)? 3  -CS 2  -TS 3  -PHILIP    Standing up from a chair using your arms (e.g., wheelchair, bedside chair)? 4  -CS 3  -TS 3  -PHILIP    Climbing 3-5 steps with a railing? 3  -CS 1  -TS 2  -PHILIP    To walk in hospital room? 3  -CS 2  -TS 2  -PHILIP    AM-PAC 6 Clicks Score (PT) 19  -CS 14  -TS 16  -PHILIP       Functional Assessment    Outcome Measure Options AM-PAC 6 Clicks Basic Mobility (PT)  -CS -- AM-PAC 6 Clicks Basic Mobility (PT)  -PHILIP          User Key  (r) = Recorded By, (t) = Taken By, (c) = Cosigned By    Initials Name Provider Type    TS Carlos Temple PTA Physical Therapist Assistant    PHILIPHossein Dominguez, PT Physical Therapist    CS Jones Wilkerson PTA Physical Therapist Assistant                 Time Calculation:    PT Charges     Row Name 11/06/22 0903             Time Calculation    Start Time 0742  -CS      PT Received On 11/06/22  -CS         Timed Charges    74924 - Gait Training Minutes  26  -CS      72795 - PT Therapeutic Activity Minutes 15  -CS      54172 - PT Self Care/Mgmt Minutes 28  -CS         Total Minutes    Timed Charges Total Minutes 69  -CS       Total Minutes 69  -CS            User Key  (r) = Recorded By, (t) = Taken  By, (c) = Cosigned By    Initials Name Provider Type    CS Jones Wilkerson PTA Physical Therapist Assistant              Therapy Charges for Today     Code Description Service Date Service Provider Modifiers Qty    52796992094 HC GAIT TRAINING EA 15 MIN 11/6/2022 Jones Wilkerson PTA GP 2    98125725791  PT THERAPEUTIC ACT EA 15 MIN 11/6/2022 Jones Wilkerson PTA GP 1    [id number]  PT SELF CARE/MGMT/TRAIN EA 15 MIN 11/6/2022 Jones Wilkerson PTA GP 2          PT G-Codes  Outcome Measure Options: AM-PAC 6 Clicks Basic Mobility (PT)  AM-PAC 6 Clicks Score (PT): 19  AM-PAC 6 Clicks Score (OT): 21    Jones Wilkerson PTA  11/6/2022

## 2022-11-06 NOTE — PROGRESS NOTES
[facility]   Hospitalist Progress Note       Patient Name: Tiffani Stacy  : 1960  MRN: [id number]  Primary Care Physician: Pratik Jung MD  Date of admission: 2022  Today's Date: 2022  Room / Bed:   353/1  Subjective   Chief Complaint: Medical management     HPI:  Mrs. Tiffani Stacy is a pleasant 62 y.o. female who is being seen by hospitalist for medical management of chronic medical conditions.  Her PCP is Dr. Jung and she has hx of HTN, LVOT and valvular heart disease (Dr. Josué Tellez), Obesity, GERD, Hiatal hernia, Glaucoma, Seasonal allergies; anxiety and depression.  Her most recent echocardiogram was Aug 2022.  Recent ECG showed SB56 with NSSTT changes.    Interval Followup: 2022    VSS  Labs stable  Patient no longer is having difficulty ambulating.  Her R leg sensation has returned.  Nerve block has worn off.    Normotensive  On room air  Labs stable  Eager to return home this a.m.      REVIEW OF SYSTEMS: All other systems reviewed and are negative.   GEN:   No fevers. No chills. No weight gain. No weight loss.     HEENT:           No dysphagia/odynophagia. No visual disturbance.    GI:                   No N/V.  No abd pain. No diarrhea. No constipation.  No bloody/black tarry stools. No hematemesis.   CV:      + chest discomfort; better with B blocker.  + palps. No lightheadedness. No syncope. No orthopnea/PND. No edema.   RESP:             No cough. No wheeze.  No increased sputum. No hemoptysis. + NICOLE.  :      No dysuria or suprapubic discomfort. No frequency.No urgency. No hesitancy. No incontinence. No hematuria. No flank pain.    MS:      + R knee joint pain and stiffness. No myalgias. RLE muscle weakness.    SKIN:   No painful or pruritic rashes.  No skin discoloration.  NEURO:          No focal numbness or weakness.  No headaches.  No ataxia. No slurred speech. No receptive/expressive aphasia.      PSYCH:           No anxiety. No depression.  ENDO:              No tremor, hair loss, heat or cold intolerance.  Objective   Temp:  [98 °F (36.7 °C)-100.1 °F (37.8 °C)] 98.2 °F (36.8 °C)  Heart Rate:  [63-76] 76  Resp:  [16-17] 16  BP: (137-157)/(50-74) 150/74  PHYSICAL EXAM        CON:   WN. WD. NAD.   EYES:  Sclera anicteric. EOMI. Normal conjunctiva.   ENT:    Oral mucosa normal without ulcers or thrush.    NECK:             No thyromegaly. No stridor. Trachea midline.  RESP:             CTA. No wheezes. No crackles.  No work of breathing or tachypnea.   CV:      Rhythm regular. Rate WNL. 2+/6 systolic murmur noted.  No edema.  GI:                   Soft, obese, and nontender. Nondistended.  Bowel sounds present.   EXT:    R knee dressing intact. Ice therapy on top. Distally RLE has sensation. OK dorsi-plantar flexion..  LYMPH:           No lymphedema noted.  No cervical lymphadenopathy.  PSYCH:           Alert. Oriented. Normal affect and mood.  NEURO:          CNII-XII grossly intact. No dysarthria or aphasia. No unilateral weakness or paresthesia.  SKIN:   No chronic venous stasis changes or varicosities.  No cellulitis    Results from last 7 days   Lab Units 11/06/22  0520 11/05/22  0454   WBC 10*3/mm3 10.91*  --    HEMOGLOBIN g/dL 11.2* 11.1*   HEMATOCRIT % 33.0* 33.1*   PLATELETS 10*3/mm3 218  --      Results from last 7 days   Lab Units 11/06/22  0520 11/05/22  0454   SODIUM mmol/L 136 136   POTASSIUM mmol/L 4.9 3.8   CO2 mmol/L 26.5 24.5   CHLORIDE mmol/L 101 100   ANION GAP mmol/L 8.5 11.5   BUN mg/dL 12 13   CREATININE mg/dL 0.78 0.82   GLUCOSE mg/dL 153* 135*           RESULTS REVIEWED:  I have personally reviewed the results from the time of this admission to 11/6/2022 10:00 EST and agree with these findings:  []  Laboratory  []  Microbiology  []  Radiology  []  EKG/Telemetry   []  Cardiology/Vascular   []  Pathology  []  Old records  []  Other:  Assessment / Plan   Assessment::  Medical management      HTN  LVOT dz and valvular heart disease (Dr. Moses  Geoff)   Obesity with BMI > 39  GERD and Hiatal hernia ([place] MAHER)  Glaucoma  Seasonal allergies  Anxiety and depression.   Osteoarthritis R knee  S/P R total knee replacement 11/4/22     Plan:  Home today.  Has worked well with therapist this a.m.  Ready for discharge. Resume home meds.  Eliquis for DVT prophylaxis and [place] Rx per orthopedist.  Home health.  Follow up 2 weeks ortho.  Keep regularly scheduled PCP appt.    Discussed plan with RN  DVT prophylaxis:  Medical and mechanical DVT prophylaxis orders are present.  CODE STATUS:              Attending documentation:  I reviewed the above documentation and independently reviewed and rounded and evaluated the patient and discussed the care plan with VANDANA Parmar PA-C, I agree with his findings and plan as documented, what I have added to the care plan and modified is as follows in my documentation and my medical decision making; 62-year-old female with aortic valve stenosis, anxiety, depression, GERD without esophagitis, essential hypertension, underwent right knee replacement, medical service was consulted for management of chief complaint of medical management of chronic medical conditions including hypertension, heart disease valvular, GERD, blood pressure medications resumed, GERD medications for management ordered, patient failed to progress as expected, difficulty ambulating, right leg proprioception, advised continued medical monitoring and management as she is unable to safely ambulate and return to the home setting.  Closely monitored progression.  She did well with therapy on 11/6/2022, warmth had decreased, swelling remained, nerve block wore off, sensation returned to the right lower extremity, GERD symptoms improved.  She was discharged in hemodynamically stable condition on 11/6/2022 to follow-up with PCP within 1 week and orthopedist in 2 weeks, to pursue outpatient therapy.  Seen and examined the day of discharge, no acute distress, no acute major  night events, patient sitting in bedside chair, review of systems obtained, all systems reviewed and negative except generalized fatigue, generalized weakness, right lower extremity knee pain.  Vitals reviewed on physical exam well-appearing female, no acute distress, regular rate rhythm, clear auscultation bilaterally, soft nontender abdomen, no lower extreme edema, right leg neurovascular intact distally.  Reviewed discharge plan, all questions answered, total discharge time 40 minutes.  More than 75% of the time of this patient's encounter was performed by me, this included face-to-face time, planning and coordinating, medical decision making and critical thinking personally done by me.  -SCOT CHARLES    Electronically signed by Charity Baez MD, 11/06/22, 4:10 PM EST.  Portions of this documentation were transcribed electronically from a voice recognition software.  I confirm all data accurately represents the service(s) I performed at today's visit.

## 2022-11-06 NOTE — THERAPY TREATMENT NOTE
Acute Care - Physical Therapy Progress Note   [facility]     Patient Name: Tiffani Stacy  : 1960  MRN: [id number]  Today's Date: 2022      Visit Dx:     ICD-10-CM ICD-9-CM   1. Primary osteoarthritis of right knee  M17.11 715.16   2. Primary localized osteoarthrosis of right lower leg  M17.11 715.16   3. Encounter for preadmission testing  Z01.818 V72.84   4. Difficulty in walking  R26.2 719.7   5. Decreased activities of daily living (ADL)  Z78.9 V49.89     Patient Active Problem List   Diagnosis   • Primary localized osteoarthrosis of right lower leg   • Chronic pain of right knee   • Heartburn   • Epigastric pain   • Colon cancer screening   • OA (osteoarthritis) of knee   • Primary osteoarthritis of right knee     Past Medical History:   Diagnosis Date   • Acid reflux    • Anesthesia complication     slept for 3 days with wisdom teeth at young age - no problems since   • Anxiety and depression    • Aortic valve stenosis     follows w/dr. gamez   • Arthritis     knee, cervical and  thoracic   • Disc degeneration, lumbar    • Glaucoma    • Heat intolerance    • HTN (hypertension)    • Limb swelling    • Seasonal allergies      Past Surgical History:   Procedure Laterality Date   • CHOLECYSTECTOMY     • COLONOSCOPY N/A 2022    Procedure: COLONOSCOPY WITH POLYPECTOMIES;  Surgeon: Betty Rossi MD;  Location: [facility] ENDOSCOPY;  Service: Gastroenterology;  Laterality: N/A;  COLON POLYPS, HEMORRHOIDS   • CYST REMOVAL      neck   • ENDOSCOPY N/A 2022    Procedure: ESOPHAGOGASTRODUODENOSCOPY WITH BX, POLYPECTOMY;  Surgeon: Betty Rossi MD;  Location: [facility] ENDOSCOPY;  Service: Gastroenterology;  Laterality: N/A;  GASTRIC POLYPS, HIATAL HERNIA   • EYE SURGERY Bilateral    • HYSTERECTOMY     • TOTAL KNEE ARTHROPLASTY Right 2022    Procedure: TOTAL KNEE ARTHROPLASTY right;  Surgeon: Natalie Marques MD;  Location: [facility] MAIN OR;  Service: Orthopedics;  Laterality:  Right;   • WISDOM TOOTH EXTRACTION       PT Assessment (last 12 hours)     PT Evaluation and Treatment     Row Name 11/06/22 1200 11/06/22 0900       Physical Therapy Time and Intention    Subjective Information complains of;pain  -CS complains of;pain  -CS    Document Type therapy note (daily note)  -CS therapy note (daily note)  -CS    Mode of Treatment individual therapy;physical therapy  -CS individual therapy;physical therapy  -CS    Patient Effort good  -CS good  -CS    Symptoms Noted During/After Treatment increased pain  -CS increased pain  -CS    Row Name 11/06/22 1200 11/06/22 0900       Pain    Pretreatment Pain Rating 3/10  -CS 2/10  -CS    Posttreatment Pain Rating 5/10  -CS 4/10  -CS    Pain Location - Side/Orientation Right  -CS Right  -CS    Pain Location generalized  -CS generalized  -CS    Pain Location - knee  -CS knee  -CS    Pain Intervention(s) -- Cold applied;Repositioned  -CS    Row Name 11/06/22 0900          Transfers    Transfers car transfer  -CS     Row Name 11/06/22 1200 11/06/22 0900       Sit-Stand Transfer    Sit-Stand [place] (Transfers) standby assist  -CS contact guard  -CS    Assistive Device (Sit-Stand Transfers) walker, front-wheeled  -CS walker, front-wheeled  -CS    Row Name 11/06/22 1200 11/06/22 0900       Stand-Sit Transfer    Stand-Sit [place] (Transfers) standby assist  -CS contact guard  -CS    Assistive Device (Stand-Sit Transfers) walker, front-wheeled  -CS walker, front-wheeled  -CS    Row Name 11/06/22 1200 11/06/22 0900       Car Transfer    Type (Car Transfer) sit-stand;stand-sit  -CS sit-stand;stand-sit  -CS    Ed[place] Level (Car Transfer) standby assist  -CS standby assist  -CS    Assistive Device (Car Transfer) leg ;walker, front-wheeled  -CS leg ;walker, front-wheeled  -CS    Comment, (Car Transfer) Pt. S/O was educated today on assisting pt. with car T/F's  -CS --    Row Name 11/06/22 1200 11/06/22 0900       Gait/Stairs  (Locomotion)    [place] Level (Gait) contact guard  -CS contact guard  -CS    Assistive Device (Gait) walker, front-wheeled  -CS walker, front-wheeled  -CS    Distance in Feet (Gait) 25  -  plus an additional 60'  -CS    Pattern (Gait) 4-point;step-through  -CS 4-point;step-through  -CS    Deviations/Abnormal Patterns (Gait) gait speed decreased;stride length decreased;weight shifting decreased  -CS gait speed decreased;stride length decreased;weight shifting decreased  -CS    Bilateral Gait Deviations forward flexed posture  -CS forward flexed posture  -CS    Gait Assessment/Intervention -- no knee buckling observed today  -CS    [place] Level (Stairs) minimum assist (75% patient effort);1 person assist  -CS minimum assist (75% patient effort);1 person assist  -CS    Handrail Location (Stairs) left side (ascending);right side (descending)  -CS left side (ascending);right side (descending)  -CS    Number of Steps (Stairs) 10  -CS 10  -CS    Ascending Technique (Stairs) step-to-step  -CS step-to-step  -CS    Descending Technique (Stairs) step-to-step  -CS step-to-step  -CS    Stairs, Impairments -- ROM decreased;strength decreased;impaired balance;pain  -CS    Comment, (Gait/Stairs) Pt. S/O was educated today on assisting pt. on ascending/descending steps  - --    Row Name 11/06/22 1200          Motor Skills    Therapeutic Exercise hip;knee;ankle  -     Row Name 11/06/22 1200          Hip (Therapeutic Exercise)    Hip (Therapeutic Exercise) AAROM (active assistive range of motion)  -     Hip AAROM (Therapeutic Exercise) right;sitting;supine;10 repetitions;5 repetitions  marches, heel slides  -     Row Name 11/06/22 1200          Knee (Therapeutic Exercise)    Knee (Therapeutic Exercise) AAROM (active assistive range of motion);isometric exercises  -     Knee AAROM (Therapeutic Exercise) right;sitting;supine;10 repetitions;2 sets  heel slides, LAQ's, SLR's, SAQ's  -     Knee Isometrics  (Therapeutic Exercise) right;quad sets;supine;10 repetitions;5 repetitions;3 second hold;other (see comments)  quad sets with heel on pillow  -CS     Row Name 11/06/22 1200          Ankle (Therapeutic Exercise)    Ankle (Therapeutic Exercise) AROM (active range of motion)  -     Ankle AROM (Therapeutic Exercise) bilateral;dorsiflexion;plantarflexion;supine;15 repititions  -CS     Row Name             Wound 11/04/22 1122 Right anterior knee Incision    Wound - Properties Group Placement Date: 11/04/22  -RL Placement Time: 1122  -RL Present on Hospital Admission: N  -RL Side: Right  -RL Orientation: anterior  -RL Location: knee  -RL Primary Wound Type: Incision  -RL    Retired Wound - Properties Group Placement Date: 11/04/22  -RL Placement Time: 1122  -RL Present on Hospital Admission: N  -RL Side: Right  -RL Orientation: anterior  -RL Location: knee  -RL Primary Wound Type: Incision  -RL    Retired Wound - Properties Group Date first assessed: 11/04/22  -RL Time first assessed: 1122  -RL Present on Hospital Admission: N  -RL Side: Right  -RL Location: knee  -RL Primary Wound Type: Incision  -RL    Row Name 11/06/22 1200 11/06/22 0900       Positioning and Restraints    Pre-Treatment Position sitting in chair/recliner  -CS sitting in chair/recliner  -CS    Post Treatment Position chair  -CS chair  -CS    In Chair reclined;call light within reach;encouraged to call for assist;with family/caregiver  -CS reclined;call light within reach;encouraged to call for assist  -CS    Row Name 11/06/22 1200 11/06/22 0900       Progress Summary (PT)    Progress Toward Functional Goals (PT) progress toward functional goals is good  -CS progress toward functional goals is good  -CS          User Key  (r) = Recorded By, (t) = Taken By, (c) = Cosigned By    Initials Name Provider Type    CS Jones Wilkerson PTA Physical Therapist Assistant    RL Anna Wadsworth, RN Registered Nurse                Physical Therapy Education      Title: PT OT SLP Therapies (Resolved)     Topic: Physical Therapy (Resolved)     Point: Mobility training (Resolved)     Learning Progress Summary           Patient Acceptance, ZULMA,LÓPEZ,SHAMIR, DAINA,DU by  at 11/5/2022 0946    ZULMA Cobos VU by PHILIP at 11/4/2022 1504                               User Key     Initials Effective Dates Name Provider Type Discipline     06/16/21 -  Marisela Santamaria OT Occupational Therapist OT    PHILIP 06/03/21 -  Hossein Herman, PT Physical Therapist PT              PT Recommendation and Plan     Progress Summary (PT)  Progress Toward Functional Goals (PT): progress toward functional goals is good   Outcome Measures     Row Name 11/06/22 0900 11/05/22 0836 11/04/22 1500       How much help from another person do you currently need...    Turning from your back to your side while in flat bed without using bedrails? 3  -CS 3  -TS 3  -PHILIP    Moving from lying on back to sitting on the side of a flat bed without bedrails? 3  -CS 3  -TS 3  -PHILIP    Moving to and from a bed to a chair (including a wheelchair)? 3  -CS 2  -TS 3  -PHILIP    Standing up from a chair using your arms (e.g., wheelchair, bedside chair)? 4  -CS 3  -TS 3  -PHILIP    Climbing 3-5 steps with a railing? 3  -CS 1  -TS 2  -PHILIP    To walk in hospital room? 3  -CS 2  -TS 2  -PHILIP    AM-PAC 6 Clicks Score (PT) 19  -CS 14  -TS 16  -PHILIP       Functional Assessment    Outcome Measure Options AM-PAC 6 Clicks Basic Mobility (PT)  -CS -- AM-PAC 6 Clicks Basic Mobility (PT)  -PHILIP          User Key  (r) = Recorded By, (t) = Taken By, (c) = Cosigned By    Initials Name Provider Type    TS Carlos Temple PTA Physical Therapist Assistant    Hossein De La O, PT Physical Therapist    Jones Davila PTA Physical Therapist Assistant                 Time Calculation:    PT Charges     Row Name 11/06/22 1237 11/06/22 0903          Time Calculation    Start Time 1015  -CS 0742  -CS     PT Received On 11/06/22  -CS 11/06/22  -CS        Timed Charges    58334 -  PT Therapeutic Exercise Minutes 30  -CS --     38533 - Gait Training Minutes  -- 26  -CS     43867 - PT Therapeutic Activity Minutes 6  -CS 15  -CS     52686 - PT Self Care/Mgmt Minutes 20  -CS 28  -CS        Total Minutes    Timed Charges Total Minutes 56  -CS 69  -CS      Total Minutes 56  -CS 69  -CS           User Key  (r) = Recorded By, (t) = Taken By, (c) = Cosigned By    Initials Name Provider Type     Jones Wilkerson PTA Physical Therapist Assistant              Therapy Charges for Today     Code Description Service Date Service Provider Modifiers Qty    43206588449 HC GAIT TRAINING EA 15 MIN 11/6/2022 Jones Wilkerson, PTA GP 2    68541049899 HC PT THERAPEUTIC ACT EA 15 MIN 11/6/2022 Jones Wilkerson, PTA GP 1    51061778615 HC PT SELF CARE/MGMT/TRAIN EA 15 MIN 11/6/2022 Jones Wilkerson, PTA GP 2    33353496197 HC PT THER PROC EA 15 MIN 11/6/2022 Jones Wilkerson, PTA GP 2    85474604391 HC PT THERAPEUTIC ACT EA 15 MIN 11/6/2022 Jones Wilkerson, PTA GP 1    20147390394 HC PT SELF CARE/MGMT/TRAIN EA 15 MIN 11/6/2022 Jones Wilkerson, PTA GP 1          PT G-Codes  Outcome Measure Options: AM-PAC 6 Clicks Basic Mobility (PT)  AM-PAC 6 Clicks Score (PT): 19  AM-PAC 6 Clicks Score (OT): 21    Jones Wilkerson PTA  11/6/2022

## 2022-11-07 ENCOUNTER — TELEPHONE (OUTPATIENT)
Dept: ORTHOPEDIC SURGERY | Facility: CLINIC | Age: 62
End: 2022-11-07

## 2022-11-07 LAB — QT INTERVAL: 464 MS

## 2022-11-07 NOTE — TELEPHONE ENCOUNTER
Provider: DR HUYNH    Caller: ARSH    Relationship to Patient: HOME HEALTH NURSE    Phone Number: [phone]    Reason for Call: HOME HEALTH CALLED TO LET DR HUYNH KNOW THAT THE PATIENT HAS STARTED WITH THEM FOR THERAPY PLEASE ADVISE

## 2022-11-07 NOTE — OUTREACH NOTE
Prep Survey    Flowsheet Row Responses   [facility] facility patient discharged from? [facility]   Is LACE score < 7 ? Yes   Emergency Room discharge w/ pulse ox? No   Eligibility Not Eligible   What are the reasons patient is not eligible? Other  [Low risk for readmission]   Does the patient have one of the following disease processes/diagnoses(primary or secondary)? Other   Prep survey completed? Yes          SONAM DONATO - Registered Nurse

## 2022-11-10 ENCOUNTER — TELEPHONE (OUTPATIENT)
Dept: ORTHOPEDIC SURGERY | Facility: CLINIC | Age: 62
End: 2022-11-10

## 2022-11-10 DIAGNOSIS — M17.11 PRIMARY OSTEOARTHRITIS OF RIGHT KNEE: ICD-10-CM

## 2022-11-10 RX ORDER — HYDROCODONE BITARTRATE AND ACETAMINOPHEN 7.5; 325 MG/1; MG/1
1 TABLET ORAL EVERY 4 HOURS PRN
Qty: 42 TABLET | Refills: 0 | Status: SHIPPED | OUTPATIENT
Start: 2022-11-10

## 2022-11-10 NOTE — TELEPHONE ENCOUNTER
PATIENT REQUESTING A REFILL OF PAIN MEDS.  [facility]

## 2022-11-18 ENCOUNTER — OFFICE VISIT (OUTPATIENT)
Dept: ORTHOPEDIC SURGERY | Facility: CLINIC | Age: 62
End: 2022-11-18

## 2022-11-18 VITALS — HEART RATE: 76 BPM | HEIGHT: 62 IN | BODY MASS INDEX: 39.2 KG/M2 | OXYGEN SATURATION: 96 % | WEIGHT: 213 LBS

## 2022-11-18 DIAGNOSIS — Z47.1 AFTERCARE FOLLOWING RIGHT KNEE JOINT REPLACEMENT SURGERY: Primary | ICD-10-CM

## 2022-11-18 DIAGNOSIS — Z96.651 AFTERCARE FOLLOWING RIGHT KNEE JOINT REPLACEMENT SURGERY: Primary | ICD-10-CM

## 2022-11-18 PROCEDURE — 99024 POSTOP FOLLOW-UP VISIT: CPT | Performed by: PHYSICIAN ASSISTANT

## 2022-11-18 RX ORDER — ASPIRIN 325 MG
325 TABLET, DELAYED RELEASE (ENTERIC COATED) ORAL DAILY
Qty: 21 TABLET | Refills: 0 | Status: SHIPPED | OUTPATIENT
Start: 2022-11-18 | End: 2022-12-20

## 2022-11-18 RX ORDER — ASPIRIN 325 MG
325 TABLET, DELAYED RELEASE (ENTERIC COATED) ORAL DAILY
Qty: 21 TABLET | Refills: 0 | Status: SHIPPED | OUTPATIENT
Start: 2022-11-18 | End: 2022-11-18

## 2022-11-18 NOTE — PROGRESS NOTES
"Chief Complaint  Pain and Follow-up of the Right Knee    Subjective      Tiffani Stacy presents to [facility] for S/p right TKA performed on 11/04/22 by Dr. Marques. Patient presents using cane for ambulation assistance today. She has home health PT working with her 2 x weekly. She states pain is well managed. Denies numbness / tingling, fever or chills. She states she has completed her Eliquis.     Objective   Allergies   Allergen Reactions   • Adhesive Tape Rash   • Bactrim [Sulfamethoxazole-Trimethoprim] Swelling   • Metal Itching     gold   • Molds & Smuts Anaphylaxis   • Naproxen Swelling   • Penicillins Rash   • Travoprost Swelling   • Trichophyton Itching       Vital Signs:   Pulse 76   Ht 157.5 cm (62\")   Wt 96.6 kg (213 lb)   SpO2 96%   BMI 38.96 kg/m²       Physical Exam    Constitutional: Awake, alert. Well nourished appearance.    Integumentary: Warm, dry, intact. No obvious rashes.    HENT: Atraumatic, normocephalic.   Respiratory: Non labored respirations .   Cardiovascular: Intact peripheral pulses.    Psychiatric: Normal mood and affect. A&O X3    Ortho Exam  RIGHT KNEE: Staples removed, well healing surgical incision, no surrounding erythema, active drainage or dehiscence. Steri strips applied. Swelling. No discoloration. -2 degrees extension to 90 degrees of flexion. Good ankle motion. Calf soft and non-tender. Sensation is intact. Neurovascular intact distally. Patient ambulates with cane.     Imaging Results (Most Recent)     Procedure Component Value Units Date/Time    XR Knee 3 View Right [[id number]] Resulted: 11/18/22 1027     Updated: 11/18/22 1028    Narrative:      X-Ray Report:  Study: X-rays ordered, taken in the office, and reviewed today  Site: right knee Xray  Indication: TKA   View: AP/Lateral and [place] view(s)  Findings: Right total knee arthroplasty is in good alignment, no loosening   or dislocation of hardware evident.   Prior studies " available for comparison: yes                       Assessment and Plan   Problem List Items Addressed This Visit        Musculoskeletal and Injuries    Aftercare following right knee joint replacement surgery - Primary    Overview     Added automatically from request for surgery [id number]            Follow Up   Return in about 4 weeks (around 12/16/2022).      Patient Instructions   X-rays taken and reviewed. Staples removed- Keep the incision clean and dry, leave open to air. Remove steri-strips after 7 to 10 days. Continue ice and elevation for associated swelling, as needed. Continue physical therapy, progress weightbearing status with PT.      Call with changes or concerns.    Follow up in 4 weeks. No imaging.       Patient was given instructions and counseling regarding her condition or for health maintenance advice. Please see specific information pulled into the AVS if appropriate.        Answers for HPI/ROS submitted by the patient on 11/14/2022  Please describe your symptoms.: Staple removal  Have you had these symptoms before?: No  How long have you been having these symptoms?: 1-2 weeks  Please list any medications you are currently taking for this condition.: Eliquis and Hydra codone  Please describe any probable cause for these symptoms. : Knee replacement surgery  What is the primary reason for your visit?: Other

## 2022-11-18 NOTE — PATIENT INSTRUCTIONS
X-rays taken and reviewed. Staples removed- Keep the incision clean and dry, leave open to air. Remove steri-strips after 7 to 10 days. Continue ice and elevation for associated swelling, as needed. Continue physical therapy, progress weightbearing status with PT.      Call with changes or concerns.    Follow up in 4 weeks. No imaging.

## 2022-12-20 ENCOUNTER — OFFICE VISIT (OUTPATIENT)
Dept: ORTHOPEDIC SURGERY | Facility: CLINIC | Age: 62
End: 2022-12-20

## 2022-12-20 VITALS — HEIGHT: 62 IN | WEIGHT: 213 LBS | BODY MASS INDEX: 39.2 KG/M2

## 2022-12-20 DIAGNOSIS — Z96.651 AFTERCARE FOLLOWING RIGHT KNEE JOINT REPLACEMENT SURGERY: Primary | ICD-10-CM

## 2022-12-20 DIAGNOSIS — Z47.1 AFTERCARE FOLLOWING RIGHT KNEE JOINT REPLACEMENT SURGERY: Primary | ICD-10-CM

## 2022-12-20 PROCEDURE — 99024 POSTOP FOLLOW-UP VISIT: CPT | Performed by: PHYSICIAN ASSISTANT

## 2022-12-20 NOTE — PROGRESS NOTES
"Chief Complaint  Follow-up of the Right Knee    Subjective      Tiffani Stacy presents to [facility] for follow-up of right total knee arthroplasty performed on 11/4/2022 by Dr. Marques.  She reports she is doing well.  She has been discharged from home health physical therapy and has plans to start outpatient physical therapy.  She has been taking diclofenac only as needed for pain.  She is no longer taking postoperative narcotics.  She has been icing as needed for pain and swelling.      Objective   Allergies   Allergen Reactions   • Adhesive Tape Rash   • Bactrim [Sulfamethoxazole-Trimethoprim] Swelling   • Metal Itching     gold   • Molds & Smuts Anaphylaxis   • Naproxen Swelling   • Penicillins Rash   • Travoprost Swelling   • Trichophyton Itching       Vital Signs:   Ht 157.5 cm (62\")   Wt 96.6 kg (213 lb)   BMI 38.96 kg/m²       Physical Exam    Constitutional: Awake, alert. Well nourished appearance.    Integumentary: Warm, dry, intact. No obvious rashes.    HENT: Atraumatic, normocephalic.   Respiratory: Non labored respirations .   Cardiovascular: Intact peripheral pulses.    Psychiatric: Normal mood and affect. A&O X3    Ortho Exam  Right knee: Well-healed surgical scar noted.  Mild edema.  Patella is well tracking.  Knee is stable to varus and valgus stress.  Full knee extension and knee flexion to 95 degrees.  Full plantarflexion and dorsiflexion of the ankle.  Sensation intact to light touch.  Distal neurovascular intact.    Imaging Results (Most Recent)     None                    Assessment and Plan   Problem List Items Addressed This Visit        Musculoskeletal and Injuries    Aftercare following right knee joint replacement surgery - Primary    Overview     Added automatically from request for surgery [id number]         Relevant Orders    Ambulatory Referral to Physical Therapy POST OP       Follow Up   Return in about 6 weeks (around 1/31/2023).  Educated on risk of " smoking. Discussed options for smoking cessation.    Patient Instructions   Patient is doing very well. She has been discharged from home health PT and plans to start outpatient PT this week. PT order placed. Advised to continue PT to completion to progress strength and ROM. Continue home exercises.     Continue icing knee as needed up to 4 times daily for no longer than 15-20 mins at a time.     Follow-up in 6 weeks. Repeat x-rays needed. Call with changes or concerns.       Patient was given instructions and counseling regarding her condition or for health maintenance advice. Please see specific information pulled into the AVS if appropriate.

## 2022-12-20 NOTE — PATIENT INSTRUCTIONS
Patient is doing very well. She has been discharged from home health PT and plans to start outpatient PT this week. PT order placed. Advised to continue PT to completion to progress strength and ROM. Continue home exercises.     Continue icing knee as needed up to 4 times daily for no longer than 15-20 mins at a time.     Follow-up in 6 weeks. Repeat x-rays needed. Call with changes or concerns.

## 2022-12-27 ENCOUNTER — TELEPHONE (OUTPATIENT)
Dept: GASTROENTEROLOGY | Facility: CLINIC | Age: 62
End: 2022-12-27

## 2022-12-27 NOTE — TELEPHONE ENCOUNTER
Spoke with patient regarding needing to reschedule their follow up appointment with Fred Sheehan as she will not be in office on original appointment date. Patient verbalized understanding and opted to cancel appointment at this time as they are doing well

## 2023-02-14 ENCOUNTER — OFFICE VISIT (OUTPATIENT)
Dept: ORTHOPEDIC SURGERY | Facility: CLINIC | Age: 63
End: 2023-02-14
Payer: MEDICAID

## 2023-02-14 VITALS — HEIGHT: 62 IN | WEIGHT: 213 LBS | BODY MASS INDEX: 39.2 KG/M2 | HEART RATE: 66 BPM | OXYGEN SATURATION: 97 %

## 2023-02-14 DIAGNOSIS — Z47.1 AFTERCARE FOLLOWING RIGHT KNEE JOINT REPLACEMENT SURGERY: Primary | ICD-10-CM

## 2023-02-14 DIAGNOSIS — Z96.651 AFTERCARE FOLLOWING RIGHT KNEE JOINT REPLACEMENT SURGERY: Primary | ICD-10-CM

## 2023-02-14 PROCEDURE — 99213 OFFICE O/P EST LOW 20 MIN: CPT | Performed by: PHYSICIAN ASSISTANT

## 2023-02-14 RX ORDER — PREDNISONE 10 MG/1
30 TABLET ORAL DAILY
Qty: 15 TABLET | Refills: 0 | Status: SHIPPED | OUTPATIENT
Start: 2023-02-14 | End: 2023-02-14

## 2023-02-14 RX ORDER — PREDNISONE 10 MG/1
30 TABLET ORAL DAILY
Qty: 15 TABLET | Refills: 0 | Status: SHIPPED | OUTPATIENT
Start: 2023-02-14 | End: 2023-02-19

## 2023-02-14 ASSESSMENT — KOOS JR
KOOS JR SCORE: 1
KOOS JR SCORE: 91.975

## 2023-02-14 NOTE — PATIENT INSTRUCTIONS
X-rays reviewed, showing hardware intact. Continue home exercise program to progress strength and ROM. Continue icing knee as needed up to 3-4 times daily for no longer than 15 to 20 minutes at a time.    Continue with lifelong antibiotic prophylaxis with dental procedures following total joint replacement.    Follow-up in 9 months. Call sooner, if needed with any changes or concerns. Repeat x-rays.

## 2023-02-14 NOTE — PROGRESS NOTES
"Chief Complaint  Pain and Follow-up of the Right Knee    Subjective      Tiffani Stacy presents to [facility] for follow-up of right total knee arthroplasty performed by Dr. Marques in 11//2022.  She presents today independently ambulatory without use of assistive device.  Reports that she has no complaints from her right knee.  She has been discharged from outpatient physical therapy and has tolerated return to baseline activities/ADLs without difficulties.  She does complain of recent gout flare to her left first MTP joint.  She had been taking diclofenac for this, however, with minimal relief.    Objective   Allergies   Allergen Reactions   • Adhesive Tape Rash   • Bactrim [Sulfamethoxazole-Trimethoprim] Swelling   • Metal Itching     gold   • Molds & Smuts Anaphylaxis   • Naproxen Swelling   • Penicillins Rash   • Travoprost Swelling   • Trichophyton Itching       Vital Signs:   Pulse 66   Ht 157.5 cm (62\")   Wt 96.6 kg (213 lb)   SpO2 97%   BMI 38.96 kg/m²       Physical Exam    Constitutional: Awake, alert. Well nourished appearance.    Integumentary: Warm, dry, intact. No obvious rashes.    HENT: Atraumatic, normocephalic.   Respiratory: Non labored respirations .   Cardiovascular: Intact peripheral pulses.    Psychiatric: Normal mood and affect. A&O X3    Ortho Exam  Right knee: Well-healed surgical scar noted.  Skin is warm, dry, and intact.  Patella is well tracking and knee is stable to varus and valgus stress.  Mild edema.  Full knee extension and flexion to 125 degrees.  Full plantarflexion and dorsiflexion of the ankle.  Sensation intact to light touch.  Distal neurovascular intact.  Patient is fully weightbearing with nonantalgic gait noted.    Imaging Results (Most Recent)     Procedure Component Value Units Date/Time    XR Knee 3 View Right [[id number]] Resulted: 02/14/23 1407     Updated: 02/14/23 1407    Narrative:      X-Ray Report:  Study: X-rays ordered, taken " in the office, and reviewed today.   Site: Right knee Xray  Indication: TKA  View: AP/Lateral, Standing and [place] view(s)  Findings: Intact right total knee arthroplasty. No evidence of hardware   malfunction.   Prior studies available for comparison: yes                     Assessment and Plan   Problem List Items Addressed This Visit        Musculoskeletal and Injuries    Aftercare following right knee joint replacement surgery - Primary    Overview     Added automatically from request for surgery [id number]         Relevant Orders    XR Knee 3 View Right (Completed)     Follow Up   Return in about 9 months (around 11/14/2023).  Patient is a non-smoker.  Did not discuss options for smoking cessation.    Patient Instructions   X-rays reviewed, showing hardware intact. Continue home exercise program to progress strength and ROM. Continue icing knee as needed up to 3-4 times daily for no longer than 15 to 20 minutes at a time.    Continue with lifelong antibiotic prophylaxis with dental procedures following total joint replacement.    Follow-up in 9 months. Call sooner, if needed with any changes or concerns. Repeat x-rays.       Patient was given instructions and counseling regarding her condition or for health maintenance advice. Please see specific information pulled into the AVS if appropriate.        Answers for HPI/ROS submitted by the patient on 2/10/2023  Please describe your symptoms.: Follow up from these surgery  Have you had these symptoms before?: No  How long have you been having these symptoms?: Greater than 2 weeks  Please list any medications you are currently taking for this condition.: None  Please describe any probable cause for these symptoms. : Follow up visit  What is the primary reason for your visit?: Other

## 2024-03-04 ENCOUNTER — TRANSCRIBE ORDERS (OUTPATIENT)
Dept: ADMINISTRATIVE | Facility: HOSPITAL | Age: 64
End: 2024-03-04
Payer: MEDICARE

## 2024-03-04 DIAGNOSIS — Z12.31 VISIT FOR SCREENING MAMMOGRAM: Primary | ICD-10-CM

## 2024-03-12 ENCOUNTER — OFFICE VISIT (OUTPATIENT)
Dept: ORTHOPEDIC SURGERY | Facility: CLINIC | Age: 64
End: 2024-03-12
Payer: MEDICARE

## 2024-03-12 VITALS — OXYGEN SATURATION: 96 % | DIASTOLIC BLOOD PRESSURE: 72 MMHG | HEART RATE: 71 BPM | SYSTOLIC BLOOD PRESSURE: 135 MMHG

## 2024-03-12 DIAGNOSIS — M25.561 RIGHT KNEE PAIN, UNSPECIFIED CHRONICITY: Primary | ICD-10-CM

## 2024-03-12 DIAGNOSIS — Z96.651 HISTORY OF TOTAL KNEE ARTHROPLASTY, RIGHT: ICD-10-CM

## 2024-03-12 PROCEDURE — 99213 OFFICE O/P EST LOW 20 MIN: CPT | Performed by: PHYSICIAN ASSISTANT

## 2024-03-12 PROCEDURE — 1160F RVW MEDS BY RX/DR IN RCRD: CPT | Performed by: PHYSICIAN ASSISTANT

## 2024-03-12 PROCEDURE — 1159F MED LIST DOCD IN RCRD: CPT | Performed by: PHYSICIAN ASSISTANT

## 2024-03-12 RX ORDER — AMOXICILLIN 500 MG/1
2000 CAPSULE ORAL ONCE
Qty: 4 CAPSULE | Refills: 3 | Status: SHIPPED | OUTPATIENT
Start: 2024-03-12 | End: 2024-03-12

## 2024-03-12 RX ORDER — MIRTAZAPINE 30 MG/1
30 TABLET, FILM COATED ORAL
COMMUNITY
Start: 2024-02-26

## 2024-03-12 RX ORDER — BUSPIRONE HYDROCHLORIDE 5 MG/1
1 TABLET ORAL 3 TIMES DAILY
COMMUNITY
Start: 2024-02-17

## 2024-03-12 RX ORDER — ROSUVASTATIN CALCIUM 5 MG/1
5 TABLET, COATED ORAL
COMMUNITY
Start: 2024-03-05

## 2024-03-12 RX ORDER — BIMATOPROST 0.1 MG/ML
SOLUTION/ DROPS OPHTHALMIC
COMMUNITY
Start: 2024-01-24

## 2024-03-12 RX ORDER — ASPIRIN 81 MG/1
81 TABLET, CHEWABLE ORAL DAILY
COMMUNITY
Start: 2023-12-06

## 2024-03-12 RX ORDER — SEMAGLUTIDE 0.68 MG/ML
INJECTION, SOLUTION SUBCUTANEOUS
COMMUNITY
Start: 2024-03-06

## 2024-03-12 NOTE — PROGRESS NOTES
"Chief Complaint  Follow-up of the Right Knee    Subjective      Tiffani Stacy presents to [facility] for follow-up of right knee.  She has a history of right total knee arthroplasty performed on 11/4/2022 by Dr. Marques.  She presents independently ambulatory without use of assistive device.  Reports that overall her knee is \"good\".  She does report some tightness/stiffness if she remains in 1 position for an extended period of time.    Objective   Allergies   Allergen Reactions    Adhesive Tape Rash    Bactrim [Sulfamethoxazole-Trimethoprim] Swelling    Metal Itching     gold    Molds & Smuts Anaphylaxis    Naproxen Swelling    Penicillins Rash    Travoprost Swelling    Trichophyton Itching       Vital Signs:   /72   Pulse 71   SpO2 96%       Physical Exam    Constitutional: Awake, alert. Well nourished appearance.    Integumentary: Warm, dry, intact. No obvious rashes.    HENT: Atraumatic, normocephalic.   Respiratory: Non labored respirations .   Cardiovascular: Intact peripheral pulses.    Psychiatric: Normal mood and affect. A&O X3    Ortho Exam  Right knee: Skin is warm, dry, and intact.  Well-healed surgical scar noted.  There is no edema.  Patella is well tracking and knee is stable to varus and valgus stress.  Full knee extension and flexion to 125 degrees.  Full plantarflexion and dorsiflexion of the ankle.  Sensation and distal neurovascular intact.    Imaging Results (Most Recent)       Procedure Component Value Units Date/Time    XR Knee 3 View Right [[id number]] Resulted: 03/13/24 1301     Updated: 03/13/24 1302    Narrative:      X-Ray Report:  Study: X-rays ordered, taken in the office, and reviewed today.   Site: Right knee Xray  Indication: TKA  View: AP/Lateral, Standing, and [place] view(s)  Findings: Intact right total knee arthroplasty. No evidence of hardware   malfunction.   Prior studies available for comparison: yes                     Assessment and " Plan   Problem List Items Addressed This Visit    None  Visit Diagnoses       Right knee pain, unspecified chronicity    -  Primary    Relevant Orders    XR Knee 3 View Right (Completed)    History of total knee arthroplasty, right              Follow Up   Return in about 1 year (around 3/12/2025).    Tobacco Use: Low Risk  (3/12/2024)    Patient History     Smoking Tobacco Use: Never     Smokeless Tobacco Use: Never     Passive Exposure: Not on file   Recent Concern: Tobacco Use - Medium Risk (1/5/2024)    Received from [facility]    Patient History     Smoking Tobacco Use: Never     Smokeless Tobacco Use: Never     Passive Exposure: Yes     Patient is a non-smoker.  Did not discuss tobacco cessation options.    Patient Instructions   X-rays reviewed, showing hardware intact. Continue home exercise program to progress strength and ROM.    Continue with lifelong antibiotic prophylaxis with dental procedures following total joint replacement.    Follow-up in 1 year. Call sooner or return to care, if needed with any changes or concerns. Repeat x-rays.     Patient was given instructions and counseling regarding her condition or for health maintenance advice. Please see specific information pulled into the AVS if appropriate.

## 2024-04-08 DIAGNOSIS — Z96.651 HISTORY OF TOTAL KNEE ARTHROPLASTY, RIGHT: Primary | ICD-10-CM

## 2024-04-08 RX ORDER — CLINDAMYCIN HYDROCHLORIDE 300 MG/1
600 CAPSULE ORAL
Qty: 2 CAPSULE | Refills: 1 | Status: SHIPPED | OUTPATIENT
Start: 2024-04-08 | End: 2024-04-08

## 2024-04-15 NOTE — PLAN OF CARE
Goal Outcome Evaluation:  Plan of Care Reviewed With: patient           Outcome Evaluation: Pt presents with deficits in ambulation, strength, ROM, and balance. Skilled therapy services warranted to address limitations and maximize function. Recommend outpatient therapy services upon discharge.   2 (mild pain)

## 2024-10-29 ENCOUNTER — TELEPHONE (OUTPATIENT)
Dept: PULMONOLOGY | Facility: CLINIC | Age: 64
End: 2024-10-29
Payer: MEDICARE

## 2024-10-29 NOTE — TELEPHONE ENCOUNTER
Patient is scheduled to see me as a new patient on 11/12/2024.  Referral reason states dyspnea on exertion due to valvular disease.  Patient probably needs to see cardiologist if they have valvular disease.  I am more than happy to see them if there is a pulmonary issue.  Need to have a copy of all medical records from patient's primary care provider before I can see the patient.

## 2024-11-05 NOTE — PROGRESS NOTES
Primary Care Provider  Pratik Jung MD     Referring Provider  Manohar Boss MD     Chief Complaint  Shortness of Breath, Cough, new patient, and Wheezing    Subjective          History of Presenting Illness  Patient is a 64-year-old female who is referred to the pulmonary clinic as a new patient for evaluation for dyspnea.  Patient states that she does get short of breath that is worse with exertion and when climbing stairs and in the heat, moderate in severity, and improved with rest.  Patient is currently not taking any inhalers or nebulizer treatments at this time.  Patient states that she has used an butyryl inhaler in the past.  Patient is a never smoker.  Patient states that she does live with a smoker and does have secondhand smoke exposure.  Patient states that she did work at a [employer] and was around dust.  Patient states that she currently has no animals in her home.  Patient denies any history of vaping.  Patient denies any history of drug use.  Patient denies any history of any blood clots or bleeding disorders.  Patient is not on any hormone replacement therapy.  Patient denies any history of any TB or positive PPD test.  Patient does report that she does have excessive daytime sleepiness and has been told that she snores.  Patient states that she has never been diagnosed with sleep apnea.  Patient denies any history of any malignancies with herself, specifically lung cancer.  Patient states that her mother had skin cancer.  Patient states that she had a maternal uncle that had lung cancer.  Patient states that she had a maternal grandmother that had colon cancer.  Patient states that she had a paternal aunt that had brain cancer.  Patient states that her brother and father both have a history of blood clots.  Patient states that she is not aware of any family history of any bleeding disorders.  Patient denies any history of any autoimmune conditions.  Patient denies any connective tissue  disease symptoms such as dry eyes, dry skin, mouth or foot ulcers, joint pains, tender swollen joints, rashes or alopecia.  Patient states that she is under the care of cardiologist, Dr. Lora for aortic valve sclerosis.  On reviewing Dr. Lora's last office visit note dated from 2024 patient did have an echocardiogram completed back in 2024 revealing aortic valve sclerosis but not stenosis.  There is mild LVOT obstruction.  Normal LV and RV systolic function. Patient denies fever, chills, night sweats, swollen glands in the head and neck, unintentional weight loss, hemoptysis, purulent sputum production, dysphagia, chest pain, palpitations, chest tightness, abdominal pain, nausea, vomiting, and diarrhea.  Patient also denies any myalgias, changes in sense of taste and/or smell, sore throat, any other coronavirus or flu-like symptoms.  Patient denies any leg swelling, orthopnea, paroxysmal nocturnal dyspnea.  Patient is able to perform activities of daily living.        Review of Systems     Family History   Problem Relation Age of Onset    Emphysema Mother     COPD Mother     Heart disease Mother     Cancer Mother     Arthritis Mother     Stroke Father     Heart disease Father     Diabetes Father             Arthritis Father     Heart failure Other     COPD Other     Malig Hyperthermia Neg Hx         Social History     Socioeconomic History    Marital status: Single   Tobacco Use    Smoking status: Never     Passive exposure: Current    Smokeless tobacco: Never    Tobacco comments:     Lives with smoker.    Vaping Use    Vaping status: Never Used   Substance and Sexual Activity    Alcohol use: Never    Drug use: Never    Sexual activity: Not Currently     Partners: Male     Birth control/protection: Abstinence, Hysterectomy        Past Medical History:   Diagnosis Date    Acid reflux     Anesthesia complication     slept for 3 days with wisdom teeth at young age - no problems since     Anxiety and depression     Aortic valve stenosis     follows w/dr. gamez    Arthritis     knee, cervical and  thoracic    Arthritis of back 2000    Arthritis of neck 2019    Cervical disc disorder 2020    Disc degeneration, lumbar     Glaucoma     Heat intolerance 2004    HTN (hypertension)     Knee swelling 2019    Limb swelling     Lumbosacral disc disease 2000    Periarthritis of shoulder 2019    Seasonal allergies     Thoracic disc disorder 2000        Immunization History   Administered Date(s) Administered    COVID-19 (PFIZER) 12YRS+ (COMIRNATY) 09/22/2023    COVID-19 (PFIZER) Purple Cap Monovalent 03/30/2021, 04/27/2021, 10/07/2021       Allergies   Allergen Reactions    Adhesive Tape Rash    Bactrim [Sulfamethoxazole-Trimethoprim] Swelling    Metal Itching     gold    Molds & Smuts Anaphylaxis    Naproxen Swelling    Penicillins Rash    Travoprost Swelling    Trichophyton Itching          Current Outpatient Medications:     ascorbic acid (VITAMIN C) 1000 MG tablet, Take 1 tablet by mouth Every 7 (Seven) Days., Disp: , Rfl:     aspirin 81 MG chewable tablet, Chew 1 tablet Daily., Disp: , Rfl:     buPROPion XL (WELLBUTRIN XL) 300 MG 24 hr tablet, Take 1 tablet by mouth Every Morning., Disp: , Rfl:     busPIRone (BUSPAR) 5 MG tablet, Take 1 tablet by mouth 3 times a day., Disp: , Rfl:     cetirizine (zyrTEC) 10 MG tablet, Take 1 tablet by mouth Every Night., Disp: , Rfl:     citalopram (CeleXA) 40 MG tablet, Take 1 tablet by mouth Every Night., Disp: , Rfl:     esomeprazole (nexIUM) 40 MG capsule, Take 1 capsule by mouth Daily., Disp: 90 capsule, Rfl: 2    fluticasone (FLONASE) 50 MCG/ACT nasal spray, Administer 2 sprays into the nostril(s) as directed by provider Daily As Needed., Disp: , Rfl:     hydrOXYzine (ATARAX) 10 MG tablet, Take 1.5 tablets by mouth 3 (Three) Times a Day As Needed for Anxiety., Disp: , Rfl:     ketotifen (ZADITOR) 0.025 % ophthalmic solution, Administer 2 drops to both eyes 2 (Two)  Times a Day., Disp: , Rfl:     losartan-hydrochlorothiazide (HYZAAR) 50-12.5 MG per tablet, Take 1 tablet by mouth Daily., Disp: , Rfl:     Lumigan 0.01 % ophthalmic drops, INSTILL 1 DROP INTO EACH EYE NIGHTLY, Disp: , Rfl:     metoprolol tartrate (LOPRESSOR) 25 MG tablet, Take 1 tablet by mouth twice daily, Disp: 180 tablet, Rfl: 3    mirtazapine (REMERON) 30 MG tablet, Take 1 tablet by mouth every night at bedtime., Disp: , Rfl:     montelukast (SINGULAIR) 10 MG tablet, Take 1 tablet by mouth Daily., Disp: , Rfl:     Mounjaro 2.5 MG/0.5ML solution pen-injector, Inject 1 syringe under the skin into the appropriate area as directed 1 (One) Time Per Week., Disp: , Rfl:     multivitamin with minerals tablet tablet, Take 1 tablet by mouth Daily., Disp: , Rfl:     rosuvastatin (CRESTOR) 5 MG tablet, Take 1 tablet by mouth every night at bedtime., Disp: , Rfl:     vitamin E 100 UNIT capsule, Take 1 capsule by mouth Daily., Disp: , Rfl:     albuterol sulfate  (90 Base) MCG/ACT inhaler, Inhale 2 puffs Every 4 (Four) Hours As Needed for Wheezing for up to 30 days., Disp: 18 g, Rfl: 2    latanoprost (XALATAN) 0.005 % ophthalmic solution, Administer 1 drop to both eyes Every Night., Disp: , Rfl:     Ozempic, 0.25 or 0.5 MG/DOSE, 2 MG/3ML solution pen-injector, INJECT 0.25 MG SUBCUTANEOUSLY ONCE A WEEK FOR 4 WEEKS, THEN INJECT 0.5 MG ONCE WEEKLY THEREAFTER, Disp: , Rfl:      Objective     Physical Exam  Vital Signs:   WDWN, Alert, NAD.    HEENT:  PERRL, EOMI.  OP, nares clear, no sinus tenderness  Neck:  Supple, no JVD, no thyromegaly.  Lymph: no axillary, cervical, supraclavicular lymphadenopathy noted bilaterally  Chest:  good aeration, clear to auscultation bilaterally, tympanic to percussion bilaterally, no work of breathing noted  CV: RRR, no MGR, pulses 2+, equal.  Abd:  Soft, NT, ND, + BS, no HSM  EXT:  no clubbing, no cyanosis, no edema, no joint tenderness  Neuro:  A&Ox3, CN grossly intact, no focal  deficits.  Skin: No rashes or lesions noted.    /65 (BP Location: Left arm, Patient Position: Sitting, Cuff Size: Large Adult)   Pulse 69   Temp 98.2 °F (36.8 °C) (Tympanic)   Resp 18   Wt 110 kg (242 lb 8 oz)   SpO2 94% Comment: room air  BMI 44.35 kg/m²         Result Review :   I have reviewed primary care provider office visit note scanned under media.  I also reviewed Dr. Renetta Givens last office visit note dated from 1/5/2024.  See scanned reports.    Procedures:           Assessment and Plan      Assessment:  1. Dyspnea.  2.  Intermittent wheezing.    3.  Seasonal allergies.  4.  Excessive daytime sleepiness.  5.  Snoring.  6.  Aortic valve sclerosis: Patient is under the care of Dr. Lora, cardiologist.  7.  GERD.  Patient is on a PPI.  8.  Secondhand smoke exposure.  9.  Never smoker.        Plan:  1.  Feno machine not working in the office today.  Not able to assess for eosinophilic airway inflammation.    2.  Will order a chest CT scan, pulmonary function test, 6-minute walk test, CBC, CMP, IgE level, and alpha-1 antitrypsin level and genotype for further evaluation.  3.  For excessive daytime sleepiness and snoring, will order sleep study to evaluate for obstructive sleep apnea.  4.  Continue Singulair, Zyrtec, and Flonase nasal spray for seasonal allergies.  5.  Will prescribe patient an albuterol inhaler to take as needed.  6.  For GERD,  patient to continue PPI.   Patient is also advised to sleep with the head of the bed elevated and do not eat 3-4 hours prior to bedtime.  7.  Vaccination status: patient reports they are up-to-date with flu, pneumonia, and Covid vaccines.  Patient is advised to continue to follow CDC recommendations such as social distancing wearing a mask and washing hands for at least 20 seconds.  8.  Smoking status: Never smoker.  9.  Patient to call the office, 911, or go to the ER with new or worsening symptoms.  10.  Follow-up in 2 months, sooner if  needed.              Follow Up   Return for 2 months.  Patient was given instructions and counseling regarding her condition or for health maintenance advice. Please see specific information pulled into the AVS if appropriate.

## 2024-11-12 ENCOUNTER — OFFICE VISIT (OUTPATIENT)
Dept: PULMONOLOGY | Facility: CLINIC | Age: 64
End: 2024-11-12
Payer: MEDICARE

## 2024-11-12 VITALS
OXYGEN SATURATION: 94 % | SYSTOLIC BLOOD PRESSURE: 153 MMHG | WEIGHT: 242.5 LBS | DIASTOLIC BLOOD PRESSURE: 65 MMHG | BODY MASS INDEX: 44.35 KG/M2 | HEART RATE: 69 BPM | RESPIRATION RATE: 18 BRPM | TEMPERATURE: 98.2 F

## 2024-11-12 DIAGNOSIS — Z77.22 SECOND HAND SMOKE EXPOSURE: ICD-10-CM

## 2024-11-12 DIAGNOSIS — R06.83 SNORING: ICD-10-CM

## 2024-11-12 DIAGNOSIS — R06.2 WHEEZING: ICD-10-CM

## 2024-11-12 DIAGNOSIS — I35.8 AORTIC VALVE SCLEROSIS: ICD-10-CM

## 2024-11-12 DIAGNOSIS — K21.9 GASTROESOPHAGEAL REFLUX DISEASE, UNSPECIFIED WHETHER ESOPHAGITIS PRESENT: ICD-10-CM

## 2024-11-12 DIAGNOSIS — J30.2 SEASONAL ALLERGIES: ICD-10-CM

## 2024-11-12 DIAGNOSIS — R06.00 DYSPNEA, UNSPECIFIED TYPE: Primary | ICD-10-CM

## 2024-11-12 DIAGNOSIS — G47.19 EXCESSIVE DAYTIME SLEEPINESS: ICD-10-CM

## 2024-11-12 PROCEDURE — 1159F MED LIST DOCD IN RCRD: CPT | Performed by: NURSE PRACTITIONER

## 2024-11-12 PROCEDURE — 1160F RVW MEDS BY RX/DR IN RCRD: CPT | Performed by: NURSE PRACTITIONER

## 2024-11-12 PROCEDURE — 99214 OFFICE O/P EST MOD 30 MIN: CPT | Performed by: NURSE PRACTITIONER

## 2024-11-12 RX ORDER — ALBUTEROL SULFATE 90 UG/1
2 INHALANT RESPIRATORY (INHALATION) EVERY 4 HOURS PRN
Qty: 18 G | Refills: 2 | Status: SHIPPED | OUTPATIENT
Start: 2024-11-12 | End: 2024-12-12

## 2024-11-15 ENCOUNTER — HOSPITAL ENCOUNTER (OUTPATIENT)
Dept: SLEEP MEDICINE | Facility: HOSPITAL | Age: 64
Discharge: HOME | End: 2024-11-15
Admitting: NURSE PRACTITIONER
Payer: MEDICARE

## 2024-11-15 DIAGNOSIS — I35.8 AORTIC VALVE SCLEROSIS: ICD-10-CM

## 2024-11-15 DIAGNOSIS — R06.2 WHEEZING: ICD-10-CM

## 2024-11-15 DIAGNOSIS — Z77.22 SECOND HAND SMOKE EXPOSURE: ICD-10-CM

## 2024-11-15 DIAGNOSIS — R06.00 DYSPNEA, UNSPECIFIED TYPE: ICD-10-CM

## 2024-11-15 DIAGNOSIS — G47.19 EXCESSIVE DAYTIME SLEEPINESS: ICD-10-CM

## 2024-11-15 PROCEDURE — G0399 HOME SLEEP TEST/TYPE 3 PORTA: HCPCS

## 2024-11-25 DIAGNOSIS — G47.33 OSA (OBSTRUCTIVE SLEEP APNEA): Primary | ICD-10-CM

## 2024-11-27 ENCOUNTER — HOSPITAL ENCOUNTER (OUTPATIENT)
Dept: CT IMAGING | Facility: HOSPITAL | Age: 64
Discharge: HOME | End: 2024-11-27
Payer: MEDICARE

## 2024-11-27 ENCOUNTER — LAB (OUTPATIENT)
Dept: LAB | Facility: HOSPITAL | Age: 64
End: 2024-11-27
Payer: MEDICARE

## 2024-11-27 DIAGNOSIS — G47.19 EXCESSIVE DAYTIME SLEEPINESS: ICD-10-CM

## 2024-11-27 DIAGNOSIS — Z77.22 SECOND HAND SMOKE EXPOSURE: ICD-10-CM

## 2024-11-27 DIAGNOSIS — R06.83 SNORING: ICD-10-CM

## 2024-11-27 DIAGNOSIS — R06.2 WHEEZING: ICD-10-CM

## 2024-11-27 DIAGNOSIS — R06.00 DYSPNEA, UNSPECIFIED TYPE: ICD-10-CM

## 2024-11-27 DIAGNOSIS — I35.8 AORTIC VALVE SCLEROSIS: ICD-10-CM

## 2024-11-27 LAB
ALBUMIN SERPL-MCNC: 4.1 G/DL (ref 3.5–5.2)
ALBUMIN/GLOB SERPL: 1.5 G/DL
ALP SERPL-CCNC: 88 U/L (ref 39–117)
ALT SERPL W P-5'-P-CCNC: 15 U/L (ref 1–33)
ANION GAP SERPL CALCULATED.3IONS-SCNC: 12.5 MMOL/L (ref 5–15)
AST SERPL-CCNC: 25 U/L (ref 1–32)
BASOPHILS # BLD AUTO: 0.02 10*3/MM3 (ref 0–0.2)
BASOPHILS NFR BLD AUTO: 0.3 % (ref 0–1.5)
BILIRUB SERPL-MCNC: 0.2 MG/DL (ref 0–1.2)
BUN SERPL-MCNC: 7 MG/DL (ref 8–23)
BUN/CREAT SERPL: 8.5 (ref 7–25)
CALCIUM SPEC-SCNC: 9.1 MG/DL (ref 8.6–10.5)
CHLORIDE SERPL-SCNC: 103 MMOL/L (ref 98–107)
CO2 SERPL-SCNC: 22.5 MMOL/L (ref 22–29)
CREAT SERPL-MCNC: 0.82 MG/DL (ref 0.57–1)
DEPRECATED RDW RBC AUTO: 43.4 FL (ref 37–54)
EGFRCR SERPLBLD CKD-EPI 2021: 79.5 ML/MIN/1.73
EOSINOPHIL # BLD AUTO: 0.22 10*3/MM3 (ref 0–0.4)
EOSINOPHIL NFR BLD AUTO: 3.8 % (ref 0.3–6.2)
ERYTHROCYTE [DISTWIDTH] IN BLOOD BY AUTOMATED COUNT: 13.8 % (ref 12.3–15.4)
GLOBULIN UR ELPH-MCNC: 2.8 GM/DL
GLUCOSE SERPL-MCNC: 168 MG/DL (ref 65–99)
HCT VFR BLD AUTO: 41.5 % (ref 34–46.6)
HGB BLD-MCNC: 13.5 G/DL (ref 12–15.9)
IMM GRANULOCYTES # BLD AUTO: 0.02 10*3/MM3 (ref 0–0.05)
IMM GRANULOCYTES NFR BLD AUTO: 0.3 % (ref 0–0.5)
LYMPHOCYTES # BLD AUTO: 2.43 10*3/MM3 (ref 0.7–3.1)
LYMPHOCYTES NFR BLD AUTO: 42.2 % (ref 19.6–45.3)
MCH RBC QN AUTO: 28.4 PG (ref 26.6–33)
MCHC RBC AUTO-ENTMCNC: 32.5 G/DL (ref 31.5–35.7)
MCV RBC AUTO: 87.4 FL (ref 79–97)
MONOCYTES # BLD AUTO: 0.78 10*3/MM3 (ref 0.1–0.9)
MONOCYTES NFR BLD AUTO: 13.5 % (ref 5–12)
NEUTROPHILS NFR BLD AUTO: 2.29 10*3/MM3 (ref 1.7–7)
NEUTROPHILS NFR BLD AUTO: 39.9 % (ref 42.7–76)
NRBC BLD AUTO-RTO: 0 /100 WBC (ref 0–0.2)
PLATELET # BLD AUTO: 245 10*3/MM3 (ref 140–450)
PMV BLD AUTO: 12.2 FL (ref 6–12)
POTASSIUM SERPL-SCNC: 3.7 MMOL/L (ref 3.5–5.2)
PROT SERPL-MCNC: 6.9 G/DL (ref 6–8.5)
RBC # BLD AUTO: 4.75 10*6/MM3 (ref 3.77–5.28)
SODIUM SERPL-SCNC: 138 MMOL/L (ref 136–145)
WBC NRBC COR # BLD AUTO: 5.76 10*3/MM3 (ref 3.4–10.8)

## 2024-11-27 PROCEDURE — 82104 ALPHA-1-ANTITRYPSIN PHENO: CPT

## 2024-11-27 PROCEDURE — 82103 ALPHA-1-ANTITRYPSIN TOTAL: CPT

## 2024-11-27 PROCEDURE — 71250 CT THORAX DX C-: CPT

## 2024-11-27 PROCEDURE — 82785 ASSAY OF IGE: CPT

## 2024-11-27 PROCEDURE — 85025 COMPLETE CBC W/AUTO DIFF WBC: CPT

## 2024-11-27 PROCEDURE — 36415 COLL VENOUS BLD VENIPUNCTURE: CPT

## 2024-11-27 PROCEDURE — 80053 COMPREHEN METABOLIC PANEL: CPT

## 2024-12-02 LAB — IGE SERPL-ACNC: 21 IU/ML (ref 6–495)

## 2024-12-04 LAB
A1AT PHENOTYP SERPL IFE: NORMAL
A1AT SERPL-MCNC: 162 MG/DL (ref 101–187)

## 2024-12-05 ENCOUNTER — TELEPHONE (OUTPATIENT)
Dept: PULMONOLOGY | Facility: CLINIC | Age: 64
End: 2024-12-05

## 2024-12-05 NOTE — TELEPHONE ENCOUNTER
Caller: Tiffani Stacy    Relationship to patient: Self    Best call back number:     [phone] (Mobile)       Patient is needing: PT MISSED A CALL FROM [facility]

## 2025-03-11 ENCOUNTER — OFFICE VISIT (OUTPATIENT)
Dept: ORTHOPEDIC SURGERY | Facility: CLINIC | Age: 65
End: 2025-03-11
Payer: MEDICARE

## 2025-03-11 VITALS
SYSTOLIC BLOOD PRESSURE: 131 MMHG | WEIGHT: 232.4 LBS | DIASTOLIC BLOOD PRESSURE: 81 MMHG | HEART RATE: 80 BPM | OXYGEN SATURATION: 95 % | BODY MASS INDEX: 42.76 KG/M2 | HEIGHT: 62 IN

## 2025-03-11 DIAGNOSIS — Z96.651 HISTORY OF TOTAL KNEE ARTHROPLASTY, RIGHT: ICD-10-CM

## 2025-03-11 DIAGNOSIS — M25.561 RIGHT KNEE PAIN, UNSPECIFIED CHRONICITY: Primary | ICD-10-CM

## 2025-03-11 RX ORDER — DOXAZOSIN 2 MG/1
1 TABLET ORAL DAILY
COMMUNITY
Start: 2025-02-11

## 2025-03-11 RX ORDER — ALLOPURINOL 300 MG/1
300 TABLET ORAL
COMMUNITY
Start: 2024-12-30

## 2025-03-11 NOTE — PROGRESS NOTES
"Chief Complaint  Follow-up of the Right Knee     Subjective      Tiffani Stacy presents to [facility] for follow up of the right knee.  She had a right total knee arthroplasty on 11/4/2022.  She is disabled and doesn't work anymore and that helps.  Her incision is well healed.      Allergies   Allergen Reactions    Adhesive Tape Rash    Bactrim [Sulfamethoxazole-Trimethoprim] Swelling    Metal Itching     gold    Molds & Smuts Anaphylaxis    Naproxen Swelling    Penicillins Rash    Travoprost Swelling    Trichophyton Itching        Social History     Socioeconomic History    Marital status: Single   Tobacco Use    Smoking status: Never     Passive exposure: Current    Smokeless tobacco: Never    Tobacco comments:     Lives with smoker.    Vaping Use    Vaping status: Never Used   Substance and Sexual Activity    Alcohol use: Never    Drug use: Never    Sexual activity: Not Currently     Partners: Male     Birth control/protection: Abstinence, Hysterectomy        I reviewed the patient's chief complaint, history of present illness, review of systems, past medical history, surgical history, family history, social history, medications, and allergy list.     Review of Systems     Constitutional: Denies fevers, chills, weight loss  Cardiovascular: Denies chest pain, shortness of breath  Skin: Denies rashes, acute skin changes  Neurologic: Denies headache, loss of consciousness      Vital Signs:   /81   Pulse 80   Ht 157.5 cm (62\")   Wt 105 kg (232 lb 6.4 oz)   SpO2 95%   BMI 42.51 kg/m²          Physical Exam  General: Alert. No acute distress    Ortho Exam        RIGHT KNEE Flexion 120. Extension 0. Stable to varus/valgus stress. Stable to anterior/posterior drawer. Neurovascularly intact.  Positive EHL, FHL, HS and TA. Sensation intact to light touch all 5 nerves of the foot. Ambulates with Non-antalgic gait. Patella is well tracking. Calf supple, non-tender.  Knee Extensor " Mechanism intact       Procedures      Imaging Results (Most Recent)       Procedure Component Value Units Date/Time    XR Knee 3 View Right [[id number]] Resulted: 03/11/25 1306     Updated: 03/11/25 1308             Result Review :     X-Ray Report:  Right knee X-Ray  Indication: Evaluation of the right knee  AP/Lateral and Ostrander view(s)  Findings: Intact right total knee arthroplasty.  No signs of loosening, subsidence or periprosthetic fracture.   Prior studies available for comparison: yes        Assessment and Plan     Diagnoses and all orders for this visit:    1. Right knee pain, unspecified chronicity (Primary)  -     XR Knee 3 View Right    2. History of total knee arthroplasty, right        Discussed the treatment plan with the patient. X-rays reviewed, showing hardware intact and no complications.  Patient is doing well. Continue home exercise program to progress strength and ROM.     Discussed the importance of lifelong antibiotic prophylaxis with dental procedures following total joint replacement. In the event of a dental procedure, patient is welcome to contact our office to obtain antibiotics prior to the procedure if their dentist does not provide the prescription.     We also discussed that if a fall/injury were to occur to the affected extremity was advised for patient to obtain x-rays for clarification that no hardware has been compromised or if showing signs of loosening.    Patient verbalized understanding.         Call or return if worsening symptoms.    Follow Up     2 years with new X ray        Patient was given instructions and counseling regarding her condition or for health maintenance advice. Please see specific information pulled into the AVS if appropriate.     Scribed for Natalie Marques MD by Adriana Julian MA.  03/11/25   13:05 EDT    I have personally performed the services described in this document as scribed by the above individual and it is both accurate and complete.  Natalie Marques MD 03/12/25

## 2025-03-18 ENCOUNTER — TELEPHONE (OUTPATIENT)
Dept: ORTHOPEDIC SURGERY | Facility: CLINIC | Age: 65
End: 2025-03-18

## 2025-03-18 NOTE — TELEPHONE ENCOUNTER
Provider: DR HUYNH    Caller: Tiffani Stacy    Relationship to Patient: Self    Phone Number: [phone]    Reason for Call: PATIENT CALLED STATING SHE FELL RECENTLY AND HAS A KNOT ON HER KNEE. right total knee arthroplasty on 11/4/2022. SHE IS WANTING TO SPEAK DIRECTLY WITH SOMEONE IN CLINICAL. PLEASE ADVISE.

## 2025-05-16 ENCOUNTER — TELEPHONE (OUTPATIENT)
Dept: ORTHOPEDIC SURGERY | Facility: CLINIC | Age: 65
End: 2025-05-16
Payer: MEDICARE

## 2025-05-16 DIAGNOSIS — Z79.2 PROPHYLACTIC ANTIBIOTIC: Primary | ICD-10-CM

## 2025-05-16 RX ORDER — CLINDAMYCIN HYDROCHLORIDE 300 MG/1
CAPSULE ORAL
Qty: 2 CAPSULE | Refills: 2 | Status: SHIPPED | OUTPATIENT
Start: 2025-05-16

## 2025-05-16 NOTE — TELEPHONE ENCOUNTER
Patient is having dental work next Monday and is needing antibiotics called in to [facility] in [place].

## 2025-05-16 NOTE — TELEPHONE ENCOUNTER
I SPOKE WITH PT AND TOLD HER CLINDAMYCIN HAD BEEN SENT TO [facility].

## (undated) DEVICE — COVER,LIGHT HANDLE,FLX,1/PK: Brand: MEDLINE INDUSTRIES, INC.

## (undated) DEVICE — Device

## (undated) DEVICE — SOL IRRG H2O PL/BG 1000ML STRL

## (undated) DEVICE — PULLOVER TOGA, 2X LARGE: Brand: FLYTE, SURGICOOL

## (undated) DEVICE — STRYKER PERFORMANCE SERIES SAGITTAL BLADE: Brand: STRYKER PERFORMANCE SERIES

## (undated) DEVICE — DRSNG SURG AQUACEL AG/ADVNTGE 9X25CM 3.5X10IN

## (undated) DEVICE — PLASMABLADE PS200-040 4.0: Brand: PLASMABLADE™

## (undated) DEVICE — 3 BONE CEMENT MIXER: Brand: MIXEVAC

## (undated) DEVICE — EGD OR ERCP KIT: Brand: MEDLINE INDUSTRIES, INC.

## (undated) DEVICE — UNDERCAST PADDING: Brand: DEROYAL

## (undated) DEVICE — GLV SURG SENSICARE PI PF LF 7 GRN STRL

## (undated) DEVICE — STERILE PATIENT PROTECTIVE PAD FOR IMP® KNEE POSITIONERS & COHESIVE WRAP (10 / CASE): Brand: DE MAYO KNEE POSITIONER®

## (undated) DEVICE — SINGLE-USE BIOPSY FORCEPS: Brand: RADIAL JAW 4

## (undated) DEVICE — NDL HYPO ECLPS SFTY 18G 1 1/2IN

## (undated) DEVICE — SNAR POLYP CAPTIFLEX XS/OVL 11X2.4MM 240CM 1P/U

## (undated) DEVICE — DISPOSABLE TOURNIQUET CUFF SINGLE BLADDER, SINGLE PORT AND QUICK CONNECT CONNECTOR: Brand: COLOR CUFF

## (undated) DEVICE — TOTAL KNEE-LF: Brand: MEDLINE INDUSTRIES, INC.

## (undated) DEVICE — ANTIBACTERIAL VIOLET BRAIDED (POLYGLACTIN 910), SYNTHETIC ABSORBABLE SURGICAL SUTURE: Brand: COATED VICRYL

## (undated) DEVICE — THE SINGLE USE ETRAP – POLYP TRAP IS USED FOR SUCTION RETRIEVAL OF ENDOSCOPICALLY REMOVED POLYPS.: Brand: ETRAP

## (undated) DEVICE — GLV SURG SENSICARE SLT PF LF 7 STRL

## (undated) DEVICE — NO-SCRATCH ™ SMALL WHITNEY CURETTE ™ IS A SINGLE-USE, PLASTIC CURETTE FOR QUICKLY APPLYING, MANIPULATING AND REMOVING BONE CEMENT DURING HIP AND KNEE REPLACEMENT SURGERY. THE PLASTIC IS SOFTER THAN STEEL INSTRUMENTS, REDUCING THE RISK OF DAMAGING THE PROSTHESIS WITH METAL INSTRUMENTS.  THE CURETTE’S 6MM TIP REMOVES EXCESS CEMENT FROM REPLACEMENT HIPS AND KNEES. EASY-TO-MANEUVER, THE SMALL BLUE CURETTE LETS YOU REMOVE CEMENT FROM ALL EDGES OF THE PROSTHESIS.NO-SCRATCH WHITNEY SMALL CURETTE FEATURES:SAFER THAN STEEL- MADE OF PLASTIC - STURDY YET SOFTER THAN SURGICAL STEEL.HANDIER- EACH TOOL HAS A MOLDED-IN THUMB INDENTATION INSTANTLY ORIENTING THE TOOL.- EASIER TO MANEUVER IN HARD TO SEE PLACES.- COLOR-CODED FOR EASY IDENTIFICATION.FASTER- COMES INDIVIDUALLY PACKAGED IN STERILE, PEEL OPEN POUCH, READY TO GO.- APPLIES, MANIPULATES, OR REMOVES CEMENT WITH FINGERTIP PRECISION.ECONOMICAL- THE COST OF A SINGLE REVISION DWARFS THE COST OF A SINGLE-USE CURETTE. - DISPOSABLE – THERE’S NO NEED TO WASTE TIME REMOVING HARDENED CEMENT OR RE-STERILIZING TOOLS.- LESS EXPENSIVE TO BUY AND INVENTORY - ORDER ONLY THE TOOL YOU USE.- PACKAGED 25 INDIVIDUALLY WRAPPED TOOLS TO A CARTON FOR CONVENIENT SHELF STORAGE.: Brand: WHITNEY NO-SCRATCH CURETTE (SMALL)

## (undated) DEVICE — SYR LUERLOK 30CC

## (undated) DEVICE — SUT VIC 2/0 CT1 36IN

## (undated) DEVICE — TOWEL,OR,DSP,ST,BLUE,STD,4/PK,20PK/CS: Brand: MEDLINE

## (undated) DEVICE — FAN SPRAY KIT: Brand: PULSAVAC®

## (undated) DEVICE — GAUZE,SPONGE,4"X4",16PLY,STRL,LF,10/TRAY: Brand: MEDLINE

## (undated) DEVICE — GLV SURG BIOGEL LTX PF 8 1/2

## (undated) DEVICE — COLON KIT: Brand: MEDLINE INDUSTRIES, INC.

## (undated) DEVICE — SLV SCD KN/LEN ADJ EXPRSS BLENDED MD 1P/U

## (undated) DEVICE — CVR LEG BOOTLEG F/R NOSKID 33IN

## (undated) DEVICE — APPL CHLORAPREP HI/LITE 26ML ORNG

## (undated) DEVICE — ESMARK: Brand: DEROYAL

## (undated) DEVICE — INTENDED FOR TISSUE SEPARATION, AND OTHER PROCEDURES THAT REQUIRE A SHARP SURGICAL BLADE TO PUNCTURE OR CUT.: Brand: BARD-PARKER ® CARBON RIB-BACK BLADES

## (undated) DEVICE — PROXIMATE RH ROTATING HEAD SKIN STAPLERS (35 WIDE) CONTAINS 35 STAINLESS STEEL STAPLES: Brand: PROXIMATE

## (undated) DEVICE — GLV SURG SENSICARE SLT PF LF 8.5 STRL

## (undated) DEVICE — Device: Brand: DEFENDO AIR/WATER/SUCTION AND BIOPSY VALVE

## (undated) DEVICE — SOL IRR NACL 0.9PCT 3000ML

## (undated) DEVICE — 450 ML BOTTLE OF 0.05% CHLORHEXIDINE GLUCONATE IN 99.95% STERILE WATER FOR IRRIGATION, USP AND APPLICATOR.: Brand: IRRISEPT ANTIMICROBIAL WOUND LAVAGE

## (undated) DEVICE — BNDG ELAS MATRX V/CLS 6INX10YD LF

## (undated) DEVICE — GLV SURG SENSICARE SLT PF LF 6.5 STRL

## (undated) DEVICE — MAT FLR ABS W/BLU/LINER 56X72IN WHT

## (undated) DEVICE — BASIC SINGLE BASIN-LF: Brand: MEDLINE INDUSTRIES, INC.